# Patient Record
Sex: FEMALE | Race: BLACK OR AFRICAN AMERICAN | Employment: FULL TIME | URBAN - METROPOLITAN AREA
[De-identification: names, ages, dates, MRNs, and addresses within clinical notes are randomized per-mention and may not be internally consistent; named-entity substitution may affect disease eponyms.]

---

## 2017-01-09 DIAGNOSIS — I10 ESSENTIAL HYPERTENSION: ICD-10-CM

## 2017-01-09 DIAGNOSIS — E78.2 MIXED HYPERLIPIDEMIA: ICD-10-CM

## 2017-01-09 DIAGNOSIS — E11.9 TYPE 2 DIABETES MELLITUS WITHOUT COMPLICATION, WITHOUT LONG-TERM CURRENT USE OF INSULIN (HCC): ICD-10-CM

## 2017-01-09 DIAGNOSIS — E66.01 MORBID OBESITY DUE TO EXCESS CALORIES (HCC): Primary | ICD-10-CM

## 2017-01-09 PROCEDURE — 99214 OFFICE O/P EST MOD 30 MIN: CPT | Performed by: INTERNAL MEDICINE

## 2017-01-09 RX ORDER — OMEPRAZOLE 40 MG/1
40 CAPSULE, DELAYED RELEASE ORAL 2 TIMES DAILY
Qty: 60 CAPSULE | Refills: 0 | Status: SHIPPED | OUTPATIENT
Start: 2017-01-09 | End: 2017-03-07

## 2017-01-09 NOTE — TELEPHONE ENCOUNTER
Last ov-07/19/16, Due for ov for med f/u, lmtcb, pt transferred to  to schedule ov, did not request this medication

## 2017-01-23 ENCOUNTER — APPOINTMENT (OUTPATIENT)
Dept: LAB | Age: 32
End: 2017-01-23
Attending: INTERNAL MEDICINE
Payer: COMMERCIAL

## 2017-01-23 ENCOUNTER — OFFICE VISIT (OUTPATIENT)
Dept: INTERNAL MEDICINE CLINIC | Facility: CLINIC | Age: 32
End: 2017-01-23

## 2017-01-23 VITALS
WEIGHT: 284.5 LBS | SYSTOLIC BLOOD PRESSURE: 132 MMHG | RESPIRATION RATE: 19 BRPM | BODY MASS INDEX: 42.14 KG/M2 | TEMPERATURE: 98 F | HEIGHT: 69 IN | HEART RATE: 88 BPM | DIASTOLIC BLOOD PRESSURE: 84 MMHG

## 2017-01-23 DIAGNOSIS — I10 ESSENTIAL HYPERTENSION: ICD-10-CM

## 2017-01-23 DIAGNOSIS — E11.9 TYPE 2 DIABETES MELLITUS WITHOUT COMPLICATION, WITHOUT LONG-TERM CURRENT USE OF INSULIN (HCC): ICD-10-CM

## 2017-01-23 DIAGNOSIS — E78.2 MIXED HYPERLIPIDEMIA: ICD-10-CM

## 2017-01-23 DIAGNOSIS — E66.01 MORBID OBESITY DUE TO EXCESS CALORIES (HCC): ICD-10-CM

## 2017-01-23 DIAGNOSIS — E66.01 MORBID OBESITY DUE TO EXCESS CALORIES (HCC): Primary | ICD-10-CM

## 2017-01-23 DIAGNOSIS — K21.9 GASTROESOPHAGEAL REFLUX DISEASE, ESOPHAGITIS PRESENCE NOT SPECIFIED: ICD-10-CM

## 2017-01-23 LAB
ALT SERPL-CCNC: 15 U/L (ref 14–54)
AST SERPL-CCNC: 11 U/L (ref 15–41)
BUN BLD-MCNC: 12 MG/DL (ref 8–20)
CALCIUM BLD-MCNC: 9 MG/DL (ref 8.3–10.3)
CHLORIDE: 102 MMOL/L (ref 101–111)
CO2: 24 MMOL/L (ref 22–32)
CREAT BLD-MCNC: 0.93 MG/DL (ref 0.55–1.02)
CREAT UR-SCNC: 128 MG/DL
EST. AVERAGE GLUCOSE BLD GHB EST-MCNC: 154 MG/DL (ref 68–126)
GLUCOSE BLD-MCNC: 93 MG/DL (ref 70–99)
HBA1C MFR BLD HPLC: 7 % (ref ?–5.7)
MICROALBUMIN UR-MCNC: 1.45 MG/DL
MICROALBUMIN/CREAT 24H UR-RTO: 11.3 UG/MG (ref ?–30)
POTASSIUM SERPL-SCNC: 3.7 MMOL/L (ref 3.6–5.1)
SODIUM SERPL-SCNC: 137 MMOL/L (ref 136–144)

## 2017-01-23 PROCEDURE — 82043 UR ALBUMIN QUANTITATIVE: CPT | Performed by: INTERNAL MEDICINE

## 2017-01-23 PROCEDURE — 82570 ASSAY OF URINE CREATININE: CPT | Performed by: INTERNAL MEDICINE

## 2017-01-23 PROCEDURE — 36415 COLL VENOUS BLD VENIPUNCTURE: CPT | Performed by: INTERNAL MEDICINE

## 2017-01-23 PROCEDURE — 80048 BASIC METABOLIC PNL TOTAL CA: CPT | Performed by: INTERNAL MEDICINE

## 2017-01-23 PROCEDURE — 99214 OFFICE O/P EST MOD 30 MIN: CPT | Performed by: INTERNAL MEDICINE

## 2017-01-23 PROCEDURE — 83036 HEMOGLOBIN GLYCOSYLATED A1C: CPT | Performed by: INTERNAL MEDICINE

## 2017-01-23 PROCEDURE — 84450 TRANSFERASE (AST) (SGOT): CPT | Performed by: INTERNAL MEDICINE

## 2017-01-23 PROCEDURE — 84460 ALANINE AMINO (ALT) (SGPT): CPT | Performed by: INTERNAL MEDICINE

## 2017-01-23 RX ORDER — GARLIC EXTRACT 500 MG
1 CAPSULE ORAL DAILY
COMMUNITY
End: 2017-06-07

## 2017-01-23 RX ORDER — CETIRIZINE HYDROCHLORIDE 10 MG/1
10 TABLET ORAL DAILY
COMMUNITY

## 2017-01-23 NOTE — PROGRESS NOTES
Holger Early is a 32year old female. HPI:   Patient presents for diabetes and morbid obesity. 1. GERD: has been following strict diet but symptoms returned when she decreased omeprazole to once daily.  Now she is on it BID and symptoms are well contro 1/2016    Comment gastritis, duodenal ulcer, HP negative      Social History:      Smoking Status: Never Smoker                      Smokeless Status: Never Used                        Alcohol Use: Yes           0.0 oz/week       0 Standard drinks or equiv Screen: score of 0 on 1/23/2017  - not on ASA  # HLP: not at goal for a diabetic but we are hopeful that weight loss will improve this.    # Migraines: cont abortive tx with nsaids  # Allergic rhinitis: well controlled with daily zyrtec, prn flonase  # Benson

## 2017-02-03 ENCOUNTER — TELEPHONE (OUTPATIENT)
Dept: INTERNAL MEDICINE CLINIC | Facility: CLINIC | Age: 32
End: 2017-02-03

## 2017-02-07 RX ORDER — LISINOPRIL 10 MG/1
10 TABLET ORAL DAILY
Qty: 90 TABLET | Refills: 1 | Status: SHIPPED | OUTPATIENT
Start: 2017-02-07 | End: 2017-12-26

## 2017-03-07 DIAGNOSIS — E66.01 MORBID OBESITY DUE TO EXCESS CALORIES (HCC): Primary | ICD-10-CM

## 2017-03-07 DIAGNOSIS — I10 ESSENTIAL HYPERTENSION: ICD-10-CM

## 2017-03-07 DIAGNOSIS — E78.2 MIXED HYPERLIPIDEMIA: ICD-10-CM

## 2017-03-07 DIAGNOSIS — E11.9 TYPE 2 DIABETES MELLITUS WITHOUT COMPLICATION, WITHOUT LONG-TERM CURRENT USE OF INSULIN (HCC): ICD-10-CM

## 2017-03-08 RX ORDER — OMEPRAZOLE 40 MG/1
40 CAPSULE, DELAYED RELEASE ORAL 2 TIMES DAILY
Qty: 60 CAPSULE | Refills: 0 | Status: SHIPPED | OUTPATIENT
Start: 2017-03-08 | End: 2017-05-15

## 2017-03-08 NOTE — TELEPHONE ENCOUNTER
Last ov-01/23/17, pt was to return in 4 weeks for weight check, contrave approved see telephone 02/03/17

## 2017-04-26 RX ORDER — HYDROCHLOROTHIAZIDE 25 MG/1
25 TABLET ORAL DAILY
Qty: 90 TABLET | Refills: 0 | Status: SHIPPED | OUTPATIENT
Start: 2017-04-26 | End: 2018-03-07

## 2017-05-05 RX ORDER — DESOGESTREL AND ETHINYL ESTRADIOL 0.15-0.03
1 KIT ORAL DAILY
Qty: 84 TABLET | Refills: 1 | Status: SHIPPED | OUTPATIENT
Start: 2017-05-05 | End: 2017-06-07 | Stop reason: ALTCHOICE

## 2017-05-15 DIAGNOSIS — E66.01 MORBID OBESITY DUE TO EXCESS CALORIES (HCC): Primary | ICD-10-CM

## 2017-05-15 DIAGNOSIS — E78.2 MIXED HYPERLIPIDEMIA: ICD-10-CM

## 2017-05-15 DIAGNOSIS — E11.9 TYPE 2 DIABETES MELLITUS WITHOUT COMPLICATION, WITHOUT LONG-TERM CURRENT USE OF INSULIN (HCC): ICD-10-CM

## 2017-05-15 DIAGNOSIS — I10 ESSENTIAL HYPERTENSION: ICD-10-CM

## 2017-05-15 DIAGNOSIS — K21.9 GASTROESOPHAGEAL REFLUX DISEASE, ESOPHAGITIS PRESENCE NOT SPECIFIED: ICD-10-CM

## 2017-05-15 RX ORDER — OMEPRAZOLE 40 MG/1
40 CAPSULE, DELAYED RELEASE ORAL 2 TIMES DAILY
Qty: 60 CAPSULE | Refills: 0 | Status: SHIPPED | OUTPATIENT
Start: 2017-05-15 | End: 2017-08-14

## 2017-06-07 ENCOUNTER — OFFICE VISIT (OUTPATIENT)
Dept: INTERNAL MEDICINE CLINIC | Facility: CLINIC | Age: 32
End: 2017-06-07

## 2017-06-07 VITALS
WEIGHT: 281 LBS | DIASTOLIC BLOOD PRESSURE: 76 MMHG | HEART RATE: 103 BPM | SYSTOLIC BLOOD PRESSURE: 138 MMHG | HEIGHT: 69 IN | RESPIRATION RATE: 20 BRPM | BODY MASS INDEX: 41.62 KG/M2 | TEMPERATURE: 98 F | OXYGEN SATURATION: 98 %

## 2017-06-07 DIAGNOSIS — E11.9 TYPE 2 DIABETES MELLITUS WITHOUT COMPLICATION, WITHOUT LONG-TERM CURRENT USE OF INSULIN (HCC): ICD-10-CM

## 2017-06-07 DIAGNOSIS — E78.2 MIXED HYPERLIPIDEMIA: ICD-10-CM

## 2017-06-07 DIAGNOSIS — I10 ESSENTIAL HYPERTENSION: ICD-10-CM

## 2017-06-07 DIAGNOSIS — E66.01 MORBID OBESITY DUE TO EXCESS CALORIES (HCC): Primary | ICD-10-CM

## 2017-06-07 PROCEDURE — 99214 OFFICE O/P EST MOD 30 MIN: CPT | Performed by: INTERNAL MEDICINE

## 2017-06-07 RX ORDER — LEVONORGESTREL AND ETHINYL ESTRADIOL 0.15-0.03
1 KIT ORAL DAILY
Qty: 91 TABLET | Refills: 1 | Status: SHIPPED | OUTPATIENT
Start: 2017-06-07 | End: 2018-05-02

## 2017-06-07 NOTE — PATIENT INSTRUCTIONS
For your diabetes: Please stop metformin and change to farxiga 5 mg once daily NOW. Please at least try to measure your fasting blood sugar in the morning 3-4 times/week and send me those numbers every 2 weeks.  This will help me figure out if we need to in

## 2017-06-07 NOTE — PROGRESS NOTES
Mp Lee is a 28year old female. HPI:   Patient presents for diabetes and morbid obesity. 1. Morbid Obesity: tried contrave and when she got to 2 pills/day she developed significant GI distress and stomach upset.  Tried it for 2.5 weeks and then h Date   • Type II or unspecified type diabetes mellitus without mention of complication, not stated as uncontrolled           Past Surgical History    UPPER GI ENDOSCOPY PERFORMED  1/2016    Comment gastritis, duodenal ulcer, HP negative      Social History retinopathy.   - Foot Exam: 1/23/2017  - LDL: no indication for statin now, cont weight loss  - BP: see above  - micro alb:creat <30 in 1/2017  - Depression Screen: score of 0 on 1/23/2017  - not on ASA  # Migraines: cont abortive tx with nsaids  # Jesica Bautista

## 2017-08-14 DIAGNOSIS — K21.9 GASTROESOPHAGEAL REFLUX DISEASE, ESOPHAGITIS PRESENCE NOT SPECIFIED: ICD-10-CM

## 2017-08-15 RX ORDER — OMEPRAZOLE 40 MG/1
40 CAPSULE, DELAYED RELEASE ORAL 2 TIMES DAILY
Qty: 60 CAPSULE | Refills: 0 | Status: SHIPPED | OUTPATIENT
Start: 2017-08-15 | End: 2017-09-21

## 2017-09-12 RX ORDER — TOPIRAMATE 25 MG/1
25 TABLET ORAL DAILY
Qty: 30 TABLET | Refills: 1 | OUTPATIENT
Start: 2017-09-12 | End: 2018-09-07

## 2017-09-20 ENCOUNTER — TELEPHONE (OUTPATIENT)
Dept: INTERNAL MEDICINE CLINIC | Facility: CLINIC | Age: 32
End: 2017-09-20

## 2017-09-20 DIAGNOSIS — K21.9 GASTROESOPHAGEAL REFLUX DISEASE, ESOPHAGITIS PRESENCE NOT SPECIFIED: ICD-10-CM

## 2017-09-20 RX ORDER — TOPIRAMATE 25 MG/1
25 TABLET ORAL DAILY
Qty: 30 TABLET | Refills: 1 | Status: CANCELLED | OUTPATIENT
Start: 2017-09-20 | End: 2018-09-15

## 2017-09-20 RX ORDER — OMEPRAZOLE 40 MG/1
40 CAPSULE, DELAYED RELEASE ORAL 2 TIMES DAILY
Qty: 60 CAPSULE | Refills: 0 | Status: CANCELLED | OUTPATIENT
Start: 2017-09-20 | End: 2018-09-15

## 2017-09-20 NOTE — TELEPHONE ENCOUNTER
Pharmacy calling on behalf of pt following up on refill request for Omeprazole 40 MG  And topiramate 25 MG. Patient does have an appointment scheduled with Dr. Lg Underwood on 09/29/17.

## 2017-09-21 RX ORDER — TOPIRAMATE 25 MG/1
25 TABLET ORAL DAILY
Qty: 30 TABLET | Refills: 0 | Status: SHIPPED | OUTPATIENT
Start: 2017-09-21 | End: 2017-11-14

## 2017-09-21 RX ORDER — OMEPRAZOLE 40 MG/1
40 CAPSULE, DELAYED RELEASE ORAL 2 TIMES DAILY
Qty: 60 CAPSULE | Refills: 0 | Status: SHIPPED | OUTPATIENT
Start: 2017-09-21 | End: 2017-09-29 | Stop reason: DRUGHIGH

## 2017-09-27 ENCOUNTER — APPOINTMENT (OUTPATIENT)
Dept: LAB | Age: 32
End: 2017-09-27
Attending: INTERNAL MEDICINE
Payer: COMMERCIAL

## 2017-09-27 DIAGNOSIS — E66.01 MORBID OBESITY DUE TO EXCESS CALORIES (HCC): ICD-10-CM

## 2017-09-27 DIAGNOSIS — I10 ESSENTIAL HYPERTENSION: ICD-10-CM

## 2017-09-27 DIAGNOSIS — E11.9 TYPE 2 DIABETES MELLITUS WITHOUT COMPLICATION, WITHOUT LONG-TERM CURRENT USE OF INSULIN (HCC): ICD-10-CM

## 2017-09-27 DIAGNOSIS — E78.2 MIXED HYPERLIPIDEMIA: ICD-10-CM

## 2017-09-27 PROCEDURE — 84450 TRANSFERASE (AST) (SGOT): CPT

## 2017-09-27 PROCEDURE — 84460 ALANINE AMINO (ALT) (SGPT): CPT

## 2017-09-27 PROCEDURE — 80061 LIPID PANEL: CPT

## 2017-09-27 PROCEDURE — 83036 HEMOGLOBIN GLYCOSYLATED A1C: CPT

## 2017-09-27 PROCEDURE — 36415 COLL VENOUS BLD VENIPUNCTURE: CPT

## 2017-09-27 PROCEDURE — 80048 BASIC METABOLIC PNL TOTAL CA: CPT

## 2017-09-29 ENCOUNTER — OFFICE VISIT (OUTPATIENT)
Dept: INTERNAL MEDICINE CLINIC | Facility: CLINIC | Age: 32
End: 2017-09-29

## 2017-09-29 VITALS
WEIGHT: 275.75 LBS | RESPIRATION RATE: 18 BRPM | BODY MASS INDEX: 39.92 KG/M2 | SYSTOLIC BLOOD PRESSURE: 124 MMHG | HEIGHT: 69.5 IN | HEART RATE: 96 BPM | TEMPERATURE: 99 F | OXYGEN SATURATION: 98 % | DIASTOLIC BLOOD PRESSURE: 84 MMHG

## 2017-09-29 DIAGNOSIS — Z30.09 ENCOUNTER FOR COUNSELING REGARDING CONTRACEPTION: ICD-10-CM

## 2017-09-29 DIAGNOSIS — Z00.00 ROUTINE GENERAL MEDICAL EXAMINATION AT A HEALTH CARE FACILITY: Primary | ICD-10-CM

## 2017-09-29 PROCEDURE — 90471 IMMUNIZATION ADMIN: CPT | Performed by: INTERNAL MEDICINE

## 2017-09-29 PROCEDURE — 99395 PREV VISIT EST AGE 18-39: CPT | Performed by: INTERNAL MEDICINE

## 2017-09-29 PROCEDURE — 90686 IIV4 VACC NO PRSV 0.5 ML IM: CPT | Performed by: INTERNAL MEDICINE

## 2017-09-29 RX ORDER — OMEPRAZOLE 40 MG/1
40 CAPSULE, DELAYED RELEASE ORAL DAILY
COMMUNITY
End: 2017-11-14

## 2017-09-29 NOTE — PROGRESS NOTES
Jed Limon is a 28year old female. HPI:   Patient presents for physical exam.  1. Obese: started topamax on 7/26/17 with starting weight of 281. She has lost 6 lbs on therapy. She never tried phentermine because she did not complete TTE.  She feels fo uncontrolled       Past Surgical History:  1/2016: UPPER GI ENDOSCOPY PERFORMED      Comment: gastritis, duodenal ulcer, HP negative   Social History:    Smoking status: Never Smoker                                                              Smokeless to diet. Increase farxiga to 10 mg and hopeful to become more active.    - had diarrhea with metformin   - Eye Exam on 11/17/16 by Rizwana Garzon w/o diabetic retinopathy.   - Foot Exam: 1/23/2017  - LDL: no indication for statin now, cont weight loss  - BP: see

## 2017-09-29 NOTE — PATIENT INSTRUCTIONS
For your heartburn, please decrease your omeprazole to 40 mg 30 minutes prior to breakfast only. If you start to have evening symptoms, you can take ranitidine (zantac) 300 mg 1 hour prior to dinner or prior to bedtime.  Once you have completed the supply o

## 2017-11-13 ENCOUNTER — TELEPHONE (OUTPATIENT)
Dept: INTERNAL MEDICINE CLINIC | Facility: CLINIC | Age: 32
End: 2017-11-13

## 2017-11-13 DIAGNOSIS — Z00.00 ROUTINE GENERAL MEDICAL EXAMINATION AT A HEALTH CARE FACILITY: ICD-10-CM

## 2017-11-13 NOTE — TELEPHONE ENCOUNTER
Requesting Omeprazole  LOV: 9/29/17  RTC: 3-6 mos  Last Relevant Labs: 9/27/17  Filled: 0 # with  refills    Future Appointments  Date Time Provider Sonia Edwards   11/14/2017 1:30 PM Twan Lambert MD [facility]

## 2017-11-14 ENCOUNTER — OFFICE VISIT (OUTPATIENT)
Dept: OBGYN CLINIC | Facility: CLINIC | Age: 32
End: 2017-11-14

## 2017-11-14 VITALS
DIASTOLIC BLOOD PRESSURE: 76 MMHG | WEIGHT: 273 LBS | BODY MASS INDEX: 40.43 KG/M2 | SYSTOLIC BLOOD PRESSURE: 112 MMHG | HEIGHT: 68.75 IN

## 2017-11-14 DIAGNOSIS — I10 HYPERTENSION, UNSPECIFIED TYPE: ICD-10-CM

## 2017-11-14 DIAGNOSIS — N92.0 MENORRHAGIA WITH REGULAR CYCLE: Primary | ICD-10-CM

## 2017-11-14 PROCEDURE — 99202 OFFICE O/P NEW SF 15 MIN: CPT | Performed by: OBSTETRICS & GYNECOLOGY

## 2017-11-14 RX ORDER — OMEPRAZOLE 40 MG/1
40 CAPSULE, DELAYED RELEASE ORAL DAILY
Qty: 90 CAPSULE | Refills: 0 | Status: SHIPPED | OUTPATIENT
Start: 2017-11-14 | End: 2018-06-29

## 2017-11-14 RX ORDER — ACETAMINOPHEN AND CODEINE PHOSPHATE 120; 12 MG/5ML; MG/5ML
0.35 SOLUTION ORAL DAILY
Qty: 3 PACKAGE | Refills: 3 | Status: SHIPPED | OUTPATIENT
Start: 2017-11-14 | End: 2017-12-12

## 2017-11-14 NOTE — PROGRESS NOTES
New gynecology visit. 28year old G 1 P 65 black female. Patient's last menstrual period was 09/20/2017 (within days). Jessenia [place] Here for consultation concerning treatment of menorrhagia.  Long standing hx of heavy menses for which she has been on many forms Omeprazole 40 MG Oral Capsule Delayed Release Take 1 capsule (40 mg total) by mouth daily. Disp: 90 capsule Rfl: 0     No current facility-administered medications on file prior to visit. ROS:    General:  No wt los, wt gain, appetite changes.   Eyes:

## 2017-12-27 NOTE — TELEPHONE ENCOUNTER
Requesting Lisinopril  LOV: 9/29/17  RTC: 12/17-3/24/18  Last Relevant Labs: 9/29/17  Filled: 2/7/17 #90 with 1refills    No future appointments.

## 2017-12-28 RX ORDER — LISINOPRIL 10 MG/1
10 TABLET ORAL DAILY
Qty: 90 TABLET | Refills: 0 | Status: SHIPPED
Start: 2017-12-28 | End: 2018-04-02

## 2017-12-28 RX ORDER — LISINOPRIL 10 MG/1
10 TABLET ORAL DAILY
Qty: 90 TABLET | Refills: 1 | OUTPATIENT
Start: 2017-12-28

## 2018-03-07 DIAGNOSIS — K21.9 GASTROESOPHAGEAL REFLUX DISEASE, ESOPHAGITIS PRESENCE NOT SPECIFIED: ICD-10-CM

## 2018-03-09 RX ORDER — HYDROCHLOROTHIAZIDE 25 MG/1
25 TABLET ORAL DAILY
Qty: 90 TABLET | Refills: 1 | Status: SHIPPED | OUTPATIENT
Start: 2018-03-09 | End: 2018-08-15

## 2018-03-09 RX ORDER — OMEPRAZOLE 40 MG/1
40 CAPSULE, DELAYED RELEASE ORAL 2 TIMES DAILY
Qty: 60 CAPSULE | Refills: 1 | Status: SHIPPED | OUTPATIENT
Start: 2018-03-09 | End: 2018-05-02

## 2018-04-02 DIAGNOSIS — I10 ESSENTIAL HYPERTENSION: ICD-10-CM

## 2018-04-02 DIAGNOSIS — E11.9 TYPE 2 DIABETES MELLITUS WITHOUT COMPLICATION, WITHOUT LONG-TERM CURRENT USE OF INSULIN (HCC): Primary | ICD-10-CM

## 2018-04-02 DIAGNOSIS — E78.2 MIXED HYPERLIPIDEMIA: ICD-10-CM

## 2018-04-02 NOTE — TELEPHONE ENCOUNTER
Lisinopril 10 mg 1 tab daily filled 12-28-17 90 with 0 refill     LOV 9-29-17     HTN: tolerating lisinopril and hctz well.     HTN: well controlled on hctz, lisinopril    return in 3-6 months     Labs 9-27-17

## 2018-04-03 RX ORDER — LISINOPRIL 10 MG/1
10 TABLET ORAL DAILY
Qty: 30 TABLET | Refills: 0 | Status: SHIPPED | OUTPATIENT
Start: 2018-04-03 | End: 2018-05-03

## 2018-04-10 DIAGNOSIS — Z00.00 ROUTINE GENERAL MEDICAL EXAMINATION AT A HEALTH CARE FACILITY: ICD-10-CM

## 2018-04-11 NOTE — TELEPHONE ENCOUNTER
Farxiga 10 mg 1 tab daily filled 9-29-17 90 with 1 refill     LOV 9-29-17     DM: tolerating farxiga well    Trying to follow ADA diet.  Increase farxiga to 10 mg and hopeful to become more active     return in 3-6 months     Labs 9-27-17     HgbA1C <5.7 % 7.7

## 2018-04-12 RX ORDER — DAPAGLIFLOZIN 10 MG/1
10 TABLET, FILM COATED ORAL DAILY
Qty: 90 TABLET | Refills: 1 | OUTPATIENT
Start: 2018-04-12 | End: 2018-05-12

## 2018-04-14 ENCOUNTER — APPOINTMENT (OUTPATIENT)
Dept: LAB | Facility: HOSPITAL | Age: 33
End: 2018-04-14
Attending: INTERNAL MEDICINE
Payer: COMMERCIAL

## 2018-04-14 DIAGNOSIS — I10 ESSENTIAL HYPERTENSION: ICD-10-CM

## 2018-04-14 DIAGNOSIS — E11.9 TYPE 2 DIABETES MELLITUS WITHOUT COMPLICATION, WITHOUT LONG-TERM CURRENT USE OF INSULIN (HCC): ICD-10-CM

## 2018-04-14 DIAGNOSIS — E78.2 MIXED HYPERLIPIDEMIA: ICD-10-CM

## 2018-04-14 PROCEDURE — 84450 TRANSFERASE (AST) (SGOT): CPT

## 2018-04-14 PROCEDURE — 84460 ALANINE AMINO (ALT) (SGPT): CPT

## 2018-04-14 PROCEDURE — 82570 ASSAY OF URINE CREATININE: CPT

## 2018-04-14 PROCEDURE — 82043 UR ALBUMIN QUANTITATIVE: CPT

## 2018-04-14 PROCEDURE — 36415 COLL VENOUS BLD VENIPUNCTURE: CPT

## 2018-04-14 PROCEDURE — 80048 BASIC METABOLIC PNL TOTAL CA: CPT

## 2018-04-14 PROCEDURE — 83036 HEMOGLOBIN GLYCOSYLATED A1C: CPT

## 2018-05-02 ENCOUNTER — OFFICE VISIT (OUTPATIENT)
Dept: INTERNAL MEDICINE CLINIC | Facility: CLINIC | Age: 33
End: 2018-05-02

## 2018-05-02 VITALS
RESPIRATION RATE: 16 BRPM | TEMPERATURE: 99 F | WEIGHT: 279.5 LBS | HEART RATE: 78 BPM | DIASTOLIC BLOOD PRESSURE: 80 MMHG | SYSTOLIC BLOOD PRESSURE: 120 MMHG | HEIGHT: 69 IN | BODY MASS INDEX: 41.4 KG/M2

## 2018-05-02 DIAGNOSIS — I10 ESSENTIAL HYPERTENSION: ICD-10-CM

## 2018-05-02 DIAGNOSIS — E11.65 UNCONTROLLED TYPE 2 DIABETES MELLITUS WITH HYPERGLYCEMIA, WITHOUT LONG-TERM CURRENT USE OF INSULIN (HCC): Primary | ICD-10-CM

## 2018-05-02 DIAGNOSIS — E66.01 MORBID OBESITY (HCC): ICD-10-CM

## 2018-05-02 DIAGNOSIS — K21.9 GASTROESOPHAGEAL REFLUX DISEASE, ESOPHAGITIS PRESENCE NOT SPECIFIED: ICD-10-CM

## 2018-05-02 PROCEDURE — 99214 OFFICE O/P EST MOD 30 MIN: CPT | Performed by: INTERNAL MEDICINE

## 2018-05-02 RX ORDER — RANITIDINE 150 MG/1
150 CAPSULE ORAL 2 TIMES DAILY
Qty: 180 CAPSULE | Refills: 1 | Status: SHIPPED | OUTPATIENT
Start: 2018-05-02 | End: 2019-03-15

## 2018-05-02 RX ORDER — ACETAMINOPHEN AND CODEINE PHOSPHATE 120; 12 MG/5ML; MG/5ML
0.35 SOLUTION ORAL DAILY
COMMUNITY
Start: 2018-04-27 | End: 2019-10-23

## 2018-05-02 RX ORDER — METFORMIN HYDROCHLORIDE EXTENDED-RELEASE TABLETS 500 MG/1
500 TABLET, FILM COATED, EXTENDED RELEASE ORAL
Qty: 30 TABLET | Refills: 1 | Status: SHIPPED | OUTPATIENT
Start: 2018-05-02 | End: 2018-06-29

## 2018-05-02 NOTE — PROGRESS NOTES
Jed Limon is a 28year old female. HPI:   Patient presents for the following issues. 1. Worsening DM: she is taking her farxiga daily. She eats healthy Monday through Friday. But the weekends she feels she is overindulging.  She is not walkign as muc Dysmenorrhea    • Hypertension    • Type II or unspecified type diabetes mellitus without mention of complication, not stated as uncontrolled       Past Surgical History:  1/2016: UPPER GI ENDOSCOPY PERFORMED      Comment: gastritis, duodenal ulcer, HP neg omeprazole daily. Will try zantac 30 minutes prior to a heavy meal and only use omeprazole for breakthrough symptoms. # HTN: well controlled on hctz, lisinopril  # Type 2 Diabetes: worsening. A1C 8.4 in 4/2018. Again discussed measures for weight loss.  Fern Osborn

## 2018-05-02 NOTE — PATIENT INSTRUCTIONS
Resources for exercise: www. [email or web address]/blog/getting-started-with-challenge/    For your diabetes please continue the farxiga 10 mg daily but also add fortamet (metformin) 500 mg.   Week One: 1 tablet with breafkast daily  Week Two: 2 tablets with breakf

## 2018-05-03 DIAGNOSIS — I10 ESSENTIAL HYPERTENSION: ICD-10-CM

## 2018-05-03 DIAGNOSIS — E11.9 TYPE 2 DIABETES MELLITUS WITHOUT COMPLICATION, WITHOUT LONG-TERM CURRENT USE OF INSULIN (HCC): ICD-10-CM

## 2018-05-03 DIAGNOSIS — E78.2 MIXED HYPERLIPIDEMIA: ICD-10-CM

## 2018-05-03 RX ORDER — LISINOPRIL 10 MG/1
10 TABLET ORAL DAILY
Qty: 30 TABLET | Refills: 5 | Status: SHIPPED | OUTPATIENT
Start: 2018-05-03 | End: 2018-10-05

## 2018-05-08 ENCOUNTER — PATIENT MESSAGE (OUTPATIENT)
Dept: INTERNAL MEDICINE CLINIC | Facility: CLINIC | Age: 33
End: 2018-05-08

## 2018-05-08 ENCOUNTER — TELEPHONE (OUTPATIENT)
Dept: INTERNAL MEDICINE CLINIC | Facility: CLINIC | Age: 33
End: 2018-05-08

## 2018-05-08 DIAGNOSIS — E11.9 TYPE 2 DIABETES MELLITUS WITHOUT COMPLICATION, WITHOUT LONG-TERM CURRENT USE OF INSULIN (HCC): Primary | ICD-10-CM

## 2018-05-08 NOTE — TELEPHONE ENCOUNTER
Spoke to patient, Coletta Hemp is a covered benefit for Metformin and patient states prefers oral than injectable, please advise

## 2018-05-08 NOTE — TELEPHONE ENCOUNTER
[facility] calling in stated the med MetFORMIN HCl ER, OSM, 500 MG (OSM) Oral Tablet 24 Hr    Is not covered under patients insurance.

## 2018-05-09 NOTE — TELEPHONE ENCOUNTER
I spoke with pt and confirmed she reviewed Dr Brendan Ortiz message. Requesting needles for pen. Please approve pended orders.

## 2018-05-10 ENCOUNTER — TELEPHONE (OUTPATIENT)
Dept: ENDOCRINOLOGY CLINIC | Facility: CLINIC | Age: 33
End: 2018-05-10

## 2018-05-10 DIAGNOSIS — E11.9 TYPE 2 DIABETES MELLITUS WITHOUT COMPLICATION, WITHOUT LONG-TERM CURRENT USE OF INSULIN (HCC): Primary | ICD-10-CM

## 2018-05-14 ENCOUNTER — NURSE ONLY (OUTPATIENT)
Dept: ENDOCRINOLOGY CLINIC | Facility: CLINIC | Age: 33
End: 2018-05-14

## 2018-05-14 VITALS — HEIGHT: 69 IN | BODY MASS INDEX: 41.03 KG/M2 | WEIGHT: 277 LBS

## 2018-05-14 DIAGNOSIS — E11.9 TYPE 2 DIABETES MELLITUS WITHOUT COMPLICATION, WITHOUT LONG-TERM CURRENT USE OF INSULIN (HCC): Primary | ICD-10-CM

## 2018-05-14 PROCEDURE — G0108 DIAB MANAGE TRN  PER INDIV: HCPCS

## 2018-05-14 NOTE — PROGRESS NOTES
Rosalee Bucio  : 1985 was seen for Injection Instruction:    Date: 2018  Start time: 3:30pm End time: 4:00    Ht 69\"   Wt 277 lb   BMI 40.91 kg/m²     Glucose today: 159      Medication type, dose and frequency: Victoza 0.6 mg for 1-2 weeks and

## 2018-05-29 DIAGNOSIS — E11.9 TYPE 2 DIABETES MELLITUS WITHOUT COMPLICATION, WITHOUT LONG-TERM CURRENT USE OF INSULIN (HCC): ICD-10-CM

## 2018-05-29 RX ORDER — LIRAGLUTIDE 6 MG/ML
INJECTION SUBCUTANEOUS
Refills: 0 | OUTPATIENT
Start: 2018-05-29

## 2018-06-04 ENCOUNTER — NURSE ONLY (OUTPATIENT)
Dept: ENDOCRINOLOGY CLINIC | Facility: CLINIC | Age: 33
End: 2018-06-04

## 2018-06-04 VITALS
HEART RATE: 100 BPM | SYSTOLIC BLOOD PRESSURE: 120 MMHG | WEIGHT: 270 LBS | DIASTOLIC BLOOD PRESSURE: 80 MMHG | BODY MASS INDEX: 40 KG/M2

## 2018-06-04 DIAGNOSIS — E11.9 TYPE 2 DIABETES MELLITUS WITHOUT COMPLICATION, WITHOUT LONG-TERM CURRENT USE OF INSULIN (HCC): Primary | ICD-10-CM

## 2018-06-04 PROCEDURE — G0108 DIAB MANAGE TRN  PER INDIV: HCPCS

## 2018-06-04 NOTE — PROGRESS NOTES
Richar MUIR: 1985 was seen for Diabetic Education Follow up:    Date: 2018   Start time:1:30 End time: 2:30    Assessment:     Assessment: /80   Pulse 100   Wt 270 lb   BMI 39.87 kg/m²        HEMOGLOBIN A1c (% of total Hgb)   Date Value Ref Range   Alt 19 14 - 54 U/L   -AST (SGOT)   Result Value Ref Range   AST 11 (L) 15 - 41 U/L   -BASIC METABOLIC PANEL (8)   Result Value Ref Range   Glucose 145 (H) 70 - 99 mg/dL   BUN 19 8 - 20 mg/dL   Creatinine 1.11 (H) 0.55 - 1.02 mg/dL   GFR, Non-Af time.    Sarah Couch RN, CDE

## 2018-06-27 ENCOUNTER — TELEPHONE (OUTPATIENT)
Dept: INTERNAL MEDICINE CLINIC | Facility: CLINIC | Age: 33
End: 2018-06-27

## 2018-06-27 NOTE — TELEPHONE ENCOUNTER
Patient has been messing Dr. Kandy Oneil in regards to setting up an appointment for [facility] . In the [facility] message Dr. Kandy Connelly said to pick a date and time to over book on her schedule.  Patient would like to come in on Friday 06/29/18 at 8:15am. P

## 2018-06-29 ENCOUNTER — OFFICE VISIT (OUTPATIENT)
Dept: INTERNAL MEDICINE CLINIC | Facility: CLINIC | Age: 33
End: 2018-06-29

## 2018-06-29 ENCOUNTER — TELEPHONE (OUTPATIENT)
Dept: INTERNAL MEDICINE CLINIC | Facility: CLINIC | Age: 33
End: 2018-06-29

## 2018-06-29 VITALS
SYSTOLIC BLOOD PRESSURE: 120 MMHG | BODY MASS INDEX: 39.58 KG/M2 | RESPIRATION RATE: 16 BRPM | HEART RATE: 100 BPM | TEMPERATURE: 99 F | DIASTOLIC BLOOD PRESSURE: 78 MMHG | WEIGHT: 267.25 LBS | HEIGHT: 69 IN

## 2018-06-29 DIAGNOSIS — E11.9 TYPE 2 DIABETES MELLITUS WITHOUT COMPLICATION, WITHOUT LONG-TERM CURRENT USE OF INSULIN (HCC): ICD-10-CM

## 2018-06-29 DIAGNOSIS — N89.8 VAGINAL DISCHARGE: Primary | ICD-10-CM

## 2018-06-29 DIAGNOSIS — E66.01 MORBID OBESITY (HCC): ICD-10-CM

## 2018-06-29 DIAGNOSIS — N89.8 VAGINAL ITCHING: ICD-10-CM

## 2018-06-29 DIAGNOSIS — I10 ESSENTIAL HYPERTENSION: ICD-10-CM

## 2018-06-29 PROCEDURE — 87660 TRICHOMONAS VAGIN DIR PROBE: CPT | Performed by: INTERNAL MEDICINE

## 2018-06-29 PROCEDURE — 87510 GARDNER VAG DNA DIR PROBE: CPT | Performed by: INTERNAL MEDICINE

## 2018-06-29 PROCEDURE — 99214 OFFICE O/P EST MOD 30 MIN: CPT | Performed by: INTERNAL MEDICINE

## 2018-06-29 PROCEDURE — 87491 CHLMYD TRACH DNA AMP PROBE: CPT | Performed by: INTERNAL MEDICINE

## 2018-06-29 PROCEDURE — 87480 CANDIDA DNA DIR PROBE: CPT | Performed by: INTERNAL MEDICINE

## 2018-06-29 PROCEDURE — 87591 N.GONORRHOEAE DNA AMP PROB: CPT | Performed by: INTERNAL MEDICINE

## 2018-06-29 NOTE — PROGRESS NOTES
Ezio Hanson is a 35year old female. HPI:   Patient presents for the following issues. 1. DM: has been taking farxiga and victoza both since May 2nd, 2018. Initially tolerated victoza very well for 1 month.  Over past 10 days her blood sugar started ri • Dysmenorrhea    • Hypertension    • Type II or unspecified type diabetes mellitus without mention of complication, not stated as uncontrolled       Past Surgical History:  1/2016: UPPER GI ENDOSCOPY PERFORMED      Comment: gastritis, duodenal ulcer, HP 4/2018.   - was doing really well on victoza but having injection site reactions so need to d/c.  I am hopeful she will do better on trulicity but if not, we will have to consider DPPV-4 inh.   - cont farxiga 10 mg daily   - had diarrhea with metformin but

## 2018-06-29 NOTE — PATIENT INSTRUCTIONS
Please stop the victoza and START once weekly trulicity injections. Please continue to keep me posted on your symptoms and blood sugars.

## 2018-07-02 ENCOUNTER — APPOINTMENT (OUTPATIENT)
Dept: LAB | Age: 33
End: 2018-07-02
Attending: INTERNAL MEDICINE
Payer: COMMERCIAL

## 2018-07-02 ENCOUNTER — TELEPHONE (OUTPATIENT)
Dept: INTERNAL MEDICINE CLINIC | Facility: CLINIC | Age: 33
End: 2018-07-02

## 2018-07-02 DIAGNOSIS — N89.8 VAGINAL DISCHARGE: ICD-10-CM

## 2018-07-02 PROCEDURE — 87491 CHLMYD TRACH DNA AMP PROBE: CPT

## 2018-07-02 PROCEDURE — 87591 N.GONORRHOEAE DNA AMP PROB: CPT

## 2018-07-02 NOTE — TELEPHONE ENCOUNTER
Louise from [facility] [employer] called and CHLAMYDIA/GONOCOCCUS, LALITA      sample was sent with swab inside with specimen so test cannot be done please contact patient

## 2018-07-02 NOTE — TELEPHONE ENCOUNTER
I called pateint to discussed her vaginal swab results. I explained trichomonas is a sexually transmitted disease so it is very important her partner is also treated. Metronidazole is treatment for both trich and BV.  She expresses understanding and agreeme

## 2018-07-02 NOTE — TELEPHONE ENCOUNTER
She does not need to see me. Please ask her to give urine sample (at lab) for chlamydia/gonorrhea testing. I have placed the order.  tY

## 2018-07-03 LAB
C TRACH DNA SPEC QL NAA+PROBE: NEGATIVE
N GONORRHOEA DNA SPEC QL NAA+PROBE: NEGATIVE

## 2018-07-25 DIAGNOSIS — N89.8 VAGINAL DISCHARGE: ICD-10-CM

## 2018-07-25 DIAGNOSIS — N89.8 VAGINAL ITCHING: ICD-10-CM

## 2018-07-25 RX ORDER — DULAGLUTIDE 0.75 MG/.5ML
0.75 INJECTION, SOLUTION SUBCUTANEOUS WEEKLY
Qty: 0.5 ML | Refills: 0 | Status: SHIPPED | OUTPATIENT
Start: 2018-07-25 | End: 2018-07-25

## 2018-07-25 NOTE — TELEPHONE ENCOUNTER
Protocol failed - Last HgBA1C < 7.5      Medication(s) to Refill:   Pending Prescriptions Disp Refills    TRULICITY 8.43 WS/5.5OB Subcutaneous Solution Pen-injector [Pharmacy Med Name: TRULICITY 3.51 XD/9.8 ML PEN]  0     Sig: INJECT 0.75 MG INTO THE SKIN

## 2018-08-15 RX ORDER — HYDROCHLOROTHIAZIDE 25 MG/1
25 TABLET ORAL DAILY
Qty: 90 TABLET | Refills: 1 | Status: SHIPPED | OUTPATIENT
Start: 2018-08-15 | End: 2019-03-15

## 2018-08-15 NOTE — TELEPHONE ENCOUNTER
Medication(s) to Refill:   Pending Prescriptions Disp Refills    HYDROCHLOROTHIAZIDE 25 MG Oral Tab [Pharmacy Med Name: HYDROCHLOROTHIAZIDE 25 MG TAB] 90 tablet 1     Sig: TAKE 1 TABLET (25 MG TOTAL) BY MOUTH DAILY.            Last Time Medication was [facility]

## 2018-09-04 RX ORDER — LIRAGLUTIDE 6 MG/ML
INJECTION SUBCUTANEOUS
Refills: 0 | OUTPATIENT
Start: 2018-09-04

## 2018-09-04 NOTE — TELEPHONE ENCOUNTER
We changed from [address] [place] [address] to [facility] on 6/79/1142.

## 2018-09-07 ENCOUNTER — APPOINTMENT (OUTPATIENT)
Dept: LAB | Age: 33
End: 2018-09-07
Attending: INTERNAL MEDICINE
Payer: COMMERCIAL

## 2018-09-07 DIAGNOSIS — E11.65 UNCONTROLLED TYPE 2 DIABETES MELLITUS WITH HYPERGLYCEMIA, WITHOUT LONG-TERM CURRENT USE OF INSULIN (HCC): ICD-10-CM

## 2018-09-07 LAB
EST. AVERAGE GLUCOSE BLD GHB EST-MCNC: 174 MG/DL (ref 68–126)
HBA1C MFR BLD HPLC: 7.7 % (ref ?–5.7)

## 2018-09-07 PROCEDURE — 36415 COLL VENOUS BLD VENIPUNCTURE: CPT

## 2018-09-07 PROCEDURE — 83036 HEMOGLOBIN GLYCOSYLATED A1C: CPT

## 2018-09-11 ENCOUNTER — NURSE ONLY (OUTPATIENT)
Dept: ENDOCRINOLOGY CLINIC | Facility: CLINIC | Age: 33
End: 2018-09-11
Payer: COMMERCIAL

## 2018-09-11 VITALS
DIASTOLIC BLOOD PRESSURE: 81 MMHG | HEART RATE: 103 BPM | HEIGHT: 69 IN | SYSTOLIC BLOOD PRESSURE: 123 MMHG | WEIGHT: 278 LBS | BODY MASS INDEX: 41.18 KG/M2

## 2018-09-11 DIAGNOSIS — E11.9 TYPE 2 DIABETES MELLITUS WITHOUT COMPLICATION, WITHOUT LONG-TERM CURRENT USE OF INSULIN (HCC): Primary | ICD-10-CM

## 2018-09-11 PROCEDURE — G0108 DIAB MANAGE TRN  PER INDIV: HCPCS

## 2018-09-11 NOTE — PROGRESS NOTES
Mp Lee  : 1985 was seen for Diabetic Education Follow up:    Date: 2018   Start time: 5:30 End time: 6:30p    Assessment:     Assessment: /81   Pulse 103   Ht 69\"   Wt 278 lb   BMI 41.05 kg/m²      HEMOGLOBIN A1c (% of total Hgb) food models. Reviewed principles for heart healthy diet (low fate, low saturated fat, high fiber, reduced sodium)    Being Active  Benefits and effect of activity on BG discussed. Reviewed when to call MD and benefits of goal setting.       Monitoring  Re

## 2018-10-05 DIAGNOSIS — E78.2 MIXED HYPERLIPIDEMIA: ICD-10-CM

## 2018-10-05 DIAGNOSIS — E11.9 TYPE 2 DIABETES MELLITUS WITHOUT COMPLICATION, WITHOUT LONG-TERM CURRENT USE OF INSULIN (HCC): ICD-10-CM

## 2018-10-05 DIAGNOSIS — I10 ESSENTIAL HYPERTENSION: ICD-10-CM

## 2018-10-05 RX ORDER — LISINOPRIL 10 MG/1
10 TABLET ORAL DAILY
Qty: 30 TABLET | Refills: 0 | Status: SHIPPED | OUTPATIENT
Start: 2018-10-05 | End: 2018-10-10

## 2018-10-05 NOTE — TELEPHONE ENCOUNTER
Refill requested:   Requested Prescriptions     Pending Prescriptions Disp Refills   • lisinopril 10 MG Oral Tab 90 tablet 1     Sig: Take 1 tablet (10 mg total) by mouth daily.      Passed protocol    Last refill: 5/3/18 Lisinopril 10 mg #30 5R    Blood Pr appointment time is dedicated to the development of a personalized education plan. This plan is developed privately in a 1:1 session with the educator. The other half hour is devoted  to learning to use a blood glucose meter.   Gretchen Adamson

## 2018-10-09 DIAGNOSIS — E11.65 UNCONTROLLED TYPE 2 DIABETES MELLITUS WITH HYPERGLYCEMIA, WITHOUT LONG-TERM CURRENT USE OF INSULIN (HCC): ICD-10-CM

## 2018-10-10 DIAGNOSIS — I10 ESSENTIAL HYPERTENSION: ICD-10-CM

## 2018-10-10 DIAGNOSIS — E78.2 MIXED HYPERLIPIDEMIA: ICD-10-CM

## 2018-10-10 DIAGNOSIS — E11.9 TYPE 2 DIABETES MELLITUS WITHOUT COMPLICATION, WITHOUT LONG-TERM CURRENT USE OF INSULIN (HCC): ICD-10-CM

## 2018-10-10 RX ORDER — LISINOPRIL 10 MG/1
10 TABLET ORAL DAILY
Qty: 30 TABLET | Refills: 0 | Status: SHIPPED | OUTPATIENT
Start: 2018-10-10 | End: 2019-03-15

## 2018-10-10 NOTE — TELEPHONE ENCOUNTER
Refill requested:   Requested Prescriptions     Pending Prescriptions Disp Refills   • lisinopril 10 MG Oral Tab 30 tablet 0     Sig: Take 1 tablet (10 mg total) by mouth daily.          Passed protocol    Last refill: 10/5/2018 # 30 tabs with 0 refills

## 2018-10-16 RX ORDER — ACETAMINOPHEN AND CODEINE PHOSPHATE 120; 12 MG/5ML; MG/5ML
SOLUTION ORAL
Qty: 84 TABLET | Refills: 2 | OUTPATIENT
Start: 2018-10-16

## 2019-03-15 ENCOUNTER — PATIENT MESSAGE (OUTPATIENT)
Dept: INTERNAL MEDICINE CLINIC | Facility: CLINIC | Age: 34
End: 2019-03-15

## 2019-03-15 ENCOUNTER — OFFICE VISIT (OUTPATIENT)
Dept: INTERNAL MEDICINE CLINIC | Facility: CLINIC | Age: 34
End: 2019-03-15
Payer: COMMERCIAL

## 2019-03-15 VITALS
RESPIRATION RATE: 14 BRPM | TEMPERATURE: 98 F | DIASTOLIC BLOOD PRESSURE: 72 MMHG | WEIGHT: 273.25 LBS | BODY MASS INDEX: 40.47 KG/M2 | HEIGHT: 69 IN | OXYGEN SATURATION: 99 % | HEART RATE: 88 BPM | SYSTOLIC BLOOD PRESSURE: 122 MMHG

## 2019-03-15 DIAGNOSIS — Z00.00 ROUTINE GENERAL MEDICAL EXAMINATION AT A HEALTH CARE FACILITY: Primary | ICD-10-CM

## 2019-03-15 DIAGNOSIS — I10 ESSENTIAL HYPERTENSION: ICD-10-CM

## 2019-03-15 DIAGNOSIS — E11.9 TYPE 2 DIABETES MELLITUS WITHOUT COMPLICATION, WITHOUT LONG-TERM CURRENT USE OF INSULIN (HCC): ICD-10-CM

## 2019-03-15 DIAGNOSIS — E66.01 MORBID OBESITY (HCC): ICD-10-CM

## 2019-03-15 LAB
CARTRIDGE LOT#: 973 NUMERIC
HEMOGLOBIN A1C: 6.5 % (ref 4.3–5.6)

## 2019-03-15 PROCEDURE — 99213 OFFICE O/P EST LOW 20 MIN: CPT | Performed by: INTERNAL MEDICINE

## 2019-03-15 PROCEDURE — 83036 HEMOGLOBIN GLYCOSYLATED A1C: CPT | Performed by: INTERNAL MEDICINE

## 2019-03-15 PROCEDURE — 99395 PREV VISIT EST AGE 18-39: CPT | Performed by: INTERNAL MEDICINE

## 2019-03-15 RX ORDER — LISINOPRIL AND HYDROCHLOROTHIAZIDE 12.5; 1 MG/1; MG/1
1 TABLET ORAL DAILY
Qty: 30 TABLET | Refills: 0 | Status: SHIPPED | OUTPATIENT
Start: 2019-03-15 | End: 2019-04-09

## 2019-03-15 NOTE — PATIENT INSTRUCTIONS
Please have a diabetic eye exam as soon as possible. Please complete labs as soon as possible. You need 3 eight ounce servings of calcium/vitamin D daily. This includes spinach, kale, broccoli, almonds, milk, yogurt, or cottage cheese.      Please s

## 2019-03-15 NOTE — PROGRESS NOTES
Teri Andrews is a 35year old female. HPI:   Patient presents for a physical exam and diabetes. Knows she will be billed for TWO visits. She was following with [facility].  Last saw them in 11/2018 and A1C was 7.2.   1. Obesity and DM: has not been on farxiga History:   Diagnosis Date   • Abnormal uterine bleeding    • Anemia    • Diabetes mellitus ([facility] [address]    • Dysmenorrhea    • Hypertension    • Type II or unspecified type diabetes mellitus without mention of complication, not stated as uncontrolled       Past S dietary modifications are paramount. No longer having GERD sx. Uses ranitidine prior to dietary indiscrtions. # HTN: well controlled on hctz, lisinopril. # Type 2 Diabetes: well controlled. A1C 6.5 today. She feels snacks are her fall-down.  We discuss

## 2019-03-18 ENCOUNTER — TELEPHONE (OUTPATIENT)
Dept: INTERNAL MEDICINE CLINIC | Facility: CLINIC | Age: 34
End: 2019-03-18

## 2019-03-18 NOTE — TELEPHONE ENCOUNTER
(Key: [id number])    [employer] is reviewing your PA request. Typically an electronic response will be received within 72 hours. To check for an update later, open this request from your dashboard.     You may close this dialog and return to your dashboard to perf

## 2019-03-20 ENCOUNTER — TELEPHONE (OUTPATIENT)
Dept: INTERNAL MEDICINE CLINIC | Facility: CLINIC | Age: 34
End: 2019-03-20

## 2019-03-20 DIAGNOSIS — Z00.00 ROUTINE GENERAL MEDICAL EXAMINATION AT A HEALTH CARE FACILITY: Primary | ICD-10-CM

## 2019-03-20 NOTE — TELEPHONE ENCOUNTER
Lab ordered cancelled through [employer] and ordered through [employer] [place] [address] at pt's request. Asked pt is [facility] was going to be her preferred lab now. She stated d/t insurance change it is. Changed pt's preference lab to [facility]. Pt verbalized understanding.

## 2019-03-24 LAB
ALT: 12 U/L (ref 6–29)
AST: 11 U/L (ref 10–30)
BUN: 12 MG/DL (ref 7–25)
CALCIUM: 9.6 MG/DL (ref 8.6–10.2)
CARBON DIOXIDE: 26 MMOL/L (ref 20–32)
CHLORIDE: 101 MMOL/L (ref 98–110)
CHOL/HDLC RATIO: 4.3 (CALC)
CHOLESTEROL, TOTAL: 187 MG/DL
CREATININE, RANDOM URINE: 135 MG/DL (ref 20–275)
CREATININE: 0.9 MG/DL (ref 0.5–1.1)
EGFR IF AFRICN AM: 97 ML/MIN/1.73M2
EGFR IF NONAFRICN AM: 84 ML/MIN/1.73M2
GLUCOSE: 130 MG/DL (ref 65–99)
HDL CHOLESTEROL: 44 MG/DL
LDL-CHOLESTEROL: 121 MG/DL (CALC)
MICROALBUMIN/CREATININE RATIO, RANDOM URINE: 11 MCG/MG CREAT
MICROALBUMIN: 1.5 MG/DL
NON-HDL CHOLESTEROL: 143 MG/DL (CALC)
POTASSIUM: 4.1 MMOL/L (ref 3.5–5.3)
SODIUM: 137 MMOL/L (ref 135–146)
TRIGLYCERIDES: 112 MG/DL

## 2019-04-09 RX ORDER — LISINOPRIL AND HYDROCHLOROTHIAZIDE 12.5; 1 MG/1; MG/1
TABLET ORAL
Qty: 90 TABLET | Refills: 3 | Status: SHIPPED | OUTPATIENT
Start: 2019-04-09 | End: 2020-04-15

## 2019-04-09 NOTE — TELEPHONE ENCOUNTER
Last OV: 3/15/19 with Dr. Cherie Arreola  Last refill date: 3/15/19     #/refills: #30, 0 refills  When pt was asked to return for OV: 3 months   Upcoming appt: 6/14/19 with Dr. Cherie Arreola  Last labs 3/23/19

## 2019-08-29 ENCOUNTER — OFFICE VISIT (OUTPATIENT)
Dept: INTERNAL MEDICINE CLINIC | Facility: CLINIC | Age: 34
End: 2019-08-29

## 2019-08-29 ENCOUNTER — APPOINTMENT (OUTPATIENT)
Dept: LAB | Age: 34
End: 2019-08-29
Attending: INTERNAL MEDICINE
Payer: COMMERCIAL

## 2019-08-29 VITALS
OXYGEN SATURATION: 98 % | BODY MASS INDEX: 38.99 KG/M2 | HEART RATE: 89 BPM | SYSTOLIC BLOOD PRESSURE: 120 MMHG | HEIGHT: 68.75 IN | WEIGHT: 263.25 LBS | TEMPERATURE: 99 F | DIASTOLIC BLOOD PRESSURE: 82 MMHG | RESPIRATION RATE: 16 BRPM

## 2019-08-29 DIAGNOSIS — E78.2 MIXED HYPERLIPIDEMIA: ICD-10-CM

## 2019-08-29 DIAGNOSIS — E11.9 TYPE 2 DIABETES MELLITUS WITHOUT COMPLICATION, WITHOUT LONG-TERM CURRENT USE OF INSULIN (HCC): Primary | ICD-10-CM

## 2019-08-29 DIAGNOSIS — Z51.81 THERAPEUTIC DRUG MONITORING: ICD-10-CM

## 2019-08-29 DIAGNOSIS — E66.01 MORBID OBESITY (HCC): ICD-10-CM

## 2019-08-29 DIAGNOSIS — K21.9 GASTROESOPHAGEAL REFLUX DISEASE, ESOPHAGITIS PRESENCE NOT SPECIFIED: ICD-10-CM

## 2019-08-29 DIAGNOSIS — I10 ESSENTIAL HYPERTENSION: ICD-10-CM

## 2019-08-29 LAB
ALT SERPL-CCNC: 19 U/L (ref 13–56)
ANION GAP SERPL CALC-SCNC: 7 MMOL/L (ref 0–18)
AST SERPL-CCNC: 10 U/L (ref 15–37)
BUN BLD-MCNC: 12 MG/DL (ref 7–18)
BUN/CREAT SERPL: 12.5 (ref 10–20)
CALCIUM BLD-MCNC: 9.4 MG/DL (ref 8.5–10.1)
CHLORIDE SERPL-SCNC: 106 MMOL/L (ref 98–112)
CO2 SERPL-SCNC: 27 MMOL/L (ref 21–32)
CREAT BLD-MCNC: 0.96 MG/DL (ref 0.55–1.02)
EST. AVERAGE GLUCOSE BLD GHB EST-MCNC: 151 MG/DL (ref 68–126)
GLUCOSE BLD-MCNC: 119 MG/DL (ref 70–99)
HBA1C MFR BLD HPLC: 6.9 % (ref ?–5.7)
OSMOLALITY SERPL CALC.SUM OF ELEC: 291 MOSM/KG (ref 275–295)
POTASSIUM SERPL-SCNC: 4.1 MMOL/L (ref 3.5–5.1)
SODIUM SERPL-SCNC: 140 MMOL/L (ref 136–145)

## 2019-08-29 PROCEDURE — 84460 ALANINE AMINO (ALT) (SGPT): CPT | Performed by: INTERNAL MEDICINE

## 2019-08-29 PROCEDURE — 80048 BASIC METABOLIC PNL TOTAL CA: CPT | Performed by: INTERNAL MEDICINE

## 2019-08-29 PROCEDURE — 99214 OFFICE O/P EST MOD 30 MIN: CPT | Performed by: INTERNAL MEDICINE

## 2019-08-29 PROCEDURE — 36415 COLL VENOUS BLD VENIPUNCTURE: CPT | Performed by: INTERNAL MEDICINE

## 2019-08-29 PROCEDURE — 83036 HEMOGLOBIN GLYCOSYLATED A1C: CPT | Performed by: INTERNAL MEDICINE

## 2019-08-29 PROCEDURE — 84450 TRANSFERASE (AST) (SGOT): CPT | Performed by: INTERNAL MEDICINE

## 2019-08-29 RX ORDER — RANITIDINE 150 MG/1
150 TABLET ORAL DAILY PRN
Qty: 90 TABLET | Refills: 3 | Status: SHIPPED | OUTPATIENT
Start: 2019-08-29 | End: 2020-08-12

## 2019-08-29 NOTE — PROGRESS NOTES
Becca Sanchez is a 29year old female. HPI:   Patient presents for DM and the following issues. 1. DM: has been only on metformin for at least 1 year. Checks her blood sugars very infrequently.  Random blood sugar once was > 130 but had forgotten to take History   Problem Relation Age of Onset   • Cancer Other    • Heart Disease Other    • Stroke Neg       Past Medical History:   Diagnosis Date   • Abnormal uterine bleeding    • Anemia    • Diabetes mellitus ([facility] [address]    • Dysmenorrhea    • Hypertension    • T lisinopril. # Type 2 Diabetes: well controlled. A1C <7 in 3/2019.  Continue to reinforce nutritional measures    - doing well on metformin 500 mg BID but if saxenda is covered we will stop metforming  - Eye Exam on 11/17/16 by Bee Paige w/o diabetic ret

## 2019-09-09 ENCOUNTER — TELEPHONE (OUTPATIENT)
Dept: INTERNAL MEDICINE CLINIC | Facility: CLINIC | Age: 34
End: 2019-09-09

## 2019-09-10 ENCOUNTER — PATIENT MESSAGE (OUTPATIENT)
Dept: INTERNAL MEDICINE CLINIC | Facility: CLINIC | Age: 34
End: 2019-09-10

## 2019-09-11 ENCOUNTER — TELEPHONE (OUTPATIENT)
Dept: INTERNAL MEDICINE CLINIC | Facility: CLINIC | Age: 34
End: 2019-09-11

## 2019-09-11 NOTE — TELEPHONE ENCOUNTER
Key: [id number]    Your information has been submitted to [employer] is reviewing the PA request and you will receive an electronic response.  You may check for the updated outcome later by reopening this request. The standard fax determination

## 2019-09-11 NOTE — PROGRESS NOTES
No we have not received anything regarding this needing a Prior Authorization. Will start PA.     See TE 9-     Sent patient mychart to inform

## 2019-09-16 NOTE — TELEPHONE ENCOUNTER
Please email patient and explain medication was denied. Please ask her to discuss further with her insurance so we can assist with coverage if there is anything more we can do.

## 2019-09-18 ENCOUNTER — TELEPHONE (OUTPATIENT)
Dept: INTERNAL MEDICINE CLINIC | Facility: CLINIC | Age: 34
End: 2019-09-18

## 2019-09-18 NOTE — TELEPHONE ENCOUNTER
Patient called in regarding the PA. Pt stated she spoke with the insurance and an appeal is required from PCP office. Please process if able to.

## 2019-09-18 NOTE — TELEPHONE ENCOUNTER
Call received from patient. Request we place an appeal.    Appeal process started.     Awaiting fax from [facility]

## 2019-09-18 NOTE — TELEPHONE ENCOUNTER
Patient calling in, seeking a prescription for yeast infection symptoms.     Pharmacy:  [facility]ARMAC[facility] Deirdre [place] [phone], [phone]

## 2019-10-08 NOTE — TELEPHONE ENCOUNTER
Appeal form filled out through [employer]mymeds. Contact plan to follow up on KEY:[id number], waiting on determination.

## 2019-10-17 ENCOUNTER — PATIENT MESSAGE (OUTPATIENT)
Dept: INTERNAL MEDICINE CLINIC | Facility: CLINIC | Age: 34
End: 2019-10-17

## 2019-10-17 DIAGNOSIS — E11.65 UNCONTROLLED TYPE 2 DIABETES MELLITUS WITH HYPERGLYCEMIA, WITHOUT LONG-TERM CURRENT USE OF INSULIN (HCC): ICD-10-CM

## 2019-10-17 NOTE — TELEPHONE ENCOUNTER
From: Sridhar Covington  To: Chrissie Gerardo MD  Sent: 10/17/2019 9:30 AM CDT  Subject: Prescription Question    The PA for Ozzie Hodgson continues to be denied for lack of clinical information. It was once approved for me. Is there anything else the office can do?  Is th

## 2019-10-17 NOTE — TELEPHONE ENCOUNTER
Per patient denied due to clinical evidence. Clinical information was entered into cover my meds:    Patient presents for DM and the following issues. 1. DM: has been only on metformin for at least 1 year. Checks her blood sugars very infrequently.  R

## 2019-10-18 NOTE — TELEPHONE ENCOUNTER
lien sent by Dr. Batsheva Cedillo MD   to Patricia Villarreal            10/17/19 5:55 PM   Dear Latasha Pelaez for your email. Our office never received any denial. This is why you are not hearing it from us.      My office staff and I personally

## 2019-10-23 RX ORDER — ACETAMINOPHEN AND CODEINE PHOSPHATE 120; 12 MG/5ML; MG/5ML
0.35 SOLUTION ORAL DAILY
Qty: 84 TABLET | Refills: 0 | Status: SHIPPED | OUTPATIENT
Start: 2019-10-23 | End: 2020-01-29

## 2019-10-23 NOTE — PROGRESS NOTES
Last OV relevant to medication: 8-     Last refill date:  4- # n/a ---reported only    When pt was asked to return for OV:3 months     Upcoming appt/reason: 12-     Labs: 8- # bmp, ast, alt

## 2020-01-08 NOTE — TELEPHONE ENCOUNTER
Saxenda inject 3 mg daily filled 10-26-19 5 pens with 1 refill     LOV 8-29-19   return in 3 months for weight management and DM.     Ailyn Barnett MD  Next apt 2-7-20   Labs 8-29-19

## 2020-01-08 NOTE — TELEPHONE ENCOUNTER
Per report, insurance would like to convert Saxenda to a 90 day supply.      Last refill 8/29/2019 #5 refills: 1  Last visit 8/29/2019, F/U: 3 months    Future Appointments   Date Time Provider Sonia Edwards   2/7/2020  8:00 AM Skye Garcia MD [facility] E

## 2020-01-28 DIAGNOSIS — E11.65 UNCONTROLLED TYPE 2 DIABETES MELLITUS WITH HYPERGLYCEMIA, WITHOUT LONG-TERM CURRENT USE OF INSULIN (HCC): ICD-10-CM

## 2020-01-29 RX ORDER — ACETAMINOPHEN AND CODEINE PHOSPHATE 120; 12 MG/5ML; MG/5ML
SOLUTION ORAL
Qty: 84 TABLET | Refills: 0 | Status: SHIPPED | OUTPATIENT
Start: 2020-01-29 | End: 2020-04-15

## 2020-01-29 NOTE — TELEPHONE ENCOUNTER
NORETHINDRONE 0.35 MG    Last OV relevant to medication: 08-     Last refill date: 10- # 84 tablet with 0 refills     When pt was asked to return for OV: 3 months    Upcoming appt/reason: 02-    Labs: 8--bmp, alt, ast, and a1c

## 2020-02-07 ENCOUNTER — OFFICE VISIT (OUTPATIENT)
Dept: INTERNAL MEDICINE CLINIC | Facility: CLINIC | Age: 35
End: 2020-02-07

## 2020-02-07 VITALS
RESPIRATION RATE: 16 BRPM | BODY MASS INDEX: 39.29 KG/M2 | DIASTOLIC BLOOD PRESSURE: 78 MMHG | SYSTOLIC BLOOD PRESSURE: 120 MMHG | WEIGHT: 265.25 LBS | TEMPERATURE: 98 F | HEIGHT: 69 IN | OXYGEN SATURATION: 96 % | HEART RATE: 86 BPM

## 2020-02-07 DIAGNOSIS — Z86.19 HISTORY OF HELICOBACTER PYLORI INFECTION: ICD-10-CM

## 2020-02-07 DIAGNOSIS — R10.12 LUQ ABDOMINAL PAIN: Primary | ICD-10-CM

## 2020-02-07 DIAGNOSIS — E11.65 UNCONTROLLED TYPE 2 DIABETES MELLITUS WITH HYPERGLYCEMIA, WITHOUT LONG-TERM CURRENT USE OF INSULIN (HCC): ICD-10-CM

## 2020-02-07 PROCEDURE — 99214 OFFICE O/P EST MOD 30 MIN: CPT | Performed by: INTERNAL MEDICINE

## 2020-02-07 RX ORDER — BLOOD-GLUCOSE METER
1 EACH MISCELLANEOUS DAILY
Qty: 1 KIT | Refills: 0 | Status: SHIPPED | OUTPATIENT
Start: 2020-02-07 | End: 2021-12-20

## 2020-02-07 RX ORDER — OMEPRAZOLE 20 MG/1
20 CAPSULE, DELAYED RELEASE ORAL
Qty: 90 CAPSULE | Refills: 0 | Status: SHIPPED | OUTPATIENT
Start: 2020-02-07 | End: 2020-04-14

## 2020-02-07 RX ORDER — ACETAMINOPHEN AND CODEINE PHOSPHATE 120; 12 MG/5ML; MG/5ML
0.35 SOLUTION ORAL
Qty: 84 TABLET | Refills: 0 | Status: CANCELLED | OUTPATIENT
Start: 2020-02-07

## 2020-02-07 NOTE — PATIENT INSTRUCTIONS
Please complete your labs today. Please provide a urine sample. You are due for your diabetic eye exam in March, 2020. Please start omeprazole 20 mg 30 minutes prior to breakfast every morning and take ranitidine 150 mg 1 hour prior to bedtime.  If th

## 2020-02-07 NOTE — PROGRESS NOTES
León Torres is a 29year old female. HPI:   Patient presents for the following issues. 1. Heartburn: flared up over past 2 months with her weight gain. She is now using ranitidine daily which is helping  2. Obesity: she had gotten up to 271 lbs.  But h Neg       Past Medical History:   Diagnosis Date   • Abnormal uterine bleeding    • Anemia    • Diabetes mellitus ([facility] [address]    • Dysmenorrhea    • Hypertension    • Type II or unspecified type diabetes mellitus without mention of complication, not stated as un above measures help, she will need EGD   # History of Duodenal Ulcer (EGD on 1/2016)  # Morbid Obesity, BMI 39 in DM and therapeutic drug monitoring: reinforced healthy plant based nutrition again. Cont metformin and she has resumed saxenda.   - did not rakan

## 2020-02-10 ENCOUNTER — APPOINTMENT (OUTPATIENT)
Dept: LAB | Age: 35
End: 2020-02-10
Attending: INTERNAL MEDICINE
Payer: COMMERCIAL

## 2020-02-10 PROCEDURE — 82570 ASSAY OF URINE CREATININE: CPT | Performed by: INTERNAL MEDICINE

## 2020-02-10 PROCEDURE — 84450 TRANSFERASE (AST) (SGOT): CPT | Performed by: INTERNAL MEDICINE

## 2020-02-10 PROCEDURE — 82043 UR ALBUMIN QUANTITATIVE: CPT | Performed by: INTERNAL MEDICINE

## 2020-02-10 PROCEDURE — 80061 LIPID PANEL: CPT | Performed by: INTERNAL MEDICINE

## 2020-02-10 PROCEDURE — 84460 ALANINE AMINO (ALT) (SGPT): CPT | Performed by: INTERNAL MEDICINE

## 2020-02-10 PROCEDURE — 36415 COLL VENOUS BLD VENIPUNCTURE: CPT | Performed by: INTERNAL MEDICINE

## 2020-02-10 PROCEDURE — 80048 BASIC METABOLIC PNL TOTAL CA: CPT | Performed by: INTERNAL MEDICINE

## 2020-02-10 PROCEDURE — 83036 HEMOGLOBIN GLYCOSYLATED A1C: CPT | Performed by: INTERNAL MEDICINE

## 2020-02-12 ENCOUNTER — TELEPHONE (OUTPATIENT)
Dept: INTERNAL MEDICINE CLINIC | Facility: CLINIC | Age: 35
End: 2020-02-12

## 2020-02-12 NOTE — TELEPHONE ENCOUNTER
Pharmacy would like a call back states that Glucose Blood (CONTOUR NEXT TEST) In Vitro Strip are not covered by insurance and would like a new Rx

## 2020-02-17 ENCOUNTER — HOSPITAL ENCOUNTER (OUTPATIENT)
Dept: ULTRASOUND IMAGING | Age: 35
Discharge: HOME | End: 2020-02-17
Attending: INTERNAL MEDICINE
Payer: COMMERCIAL

## 2020-02-17 DIAGNOSIS — R10.12 LUQ ABDOMINAL PAIN: ICD-10-CM

## 2020-02-17 PROCEDURE — 76700 US EXAM ABDOM COMPLETE: CPT | Performed by: INTERNAL MEDICINE

## 2020-02-18 ENCOUNTER — PATIENT MESSAGE (OUTPATIENT)
Dept: INTERNAL MEDICINE CLINIC | Facility: CLINIC | Age: 35
End: 2020-02-18

## 2020-02-18 DIAGNOSIS — R10.12 LUQ ABDOMINAL PAIN: Primary | ICD-10-CM

## 2020-02-18 NOTE — TELEPHONE ENCOUNTER
From: Gregorio Garcia  To: Ulis Sever, MD  Sent: 2/18/2020 8:31 AM CST  Subject: Visit Follow-up Question    Hi Dr. Lolita Fernández,    I received your message about the ultrasound but now I cant find it.  I am still having the pain at times but I am not noticing that it

## 2020-03-02 ENCOUNTER — APPOINTMENT (OUTPATIENT)
Dept: LAB | Age: 35
End: 2020-03-02
Attending: INTERNAL MEDICINE
Payer: COMMERCIAL

## 2020-03-02 DIAGNOSIS — R10.12 LUQ ABDOMINAL PAIN: ICD-10-CM

## 2020-03-02 DIAGNOSIS — Z86.19 HISTORY OF HELICOBACTER PYLORI INFECTION: ICD-10-CM

## 2020-03-02 PROCEDURE — 87338 HPYLORI STOOL AG IA: CPT

## 2020-03-03 LAB — H PYLORI AG STL QL IA: NEGATIVE

## 2020-04-14 DIAGNOSIS — E11.65 UNCONTROLLED TYPE 2 DIABETES MELLITUS WITH HYPERGLYCEMIA, WITHOUT LONG-TERM CURRENT USE OF INSULIN (HCC): ICD-10-CM

## 2020-04-15 RX ORDER — ACETAMINOPHEN AND CODEINE PHOSPHATE 120; 12 MG/5ML; MG/5ML
SOLUTION ORAL
Qty: 84 TABLET | Refills: 0 | Status: SHIPPED | OUTPATIENT
Start: 2020-04-15 | End: 2020-07-09

## 2020-04-15 RX ORDER — LISINOPRIL AND HYDROCHLOROTHIAZIDE 12.5; 1 MG/1; MG/1
TABLET ORAL
Qty: 90 TABLET | Refills: 0 | Status: SHIPPED | OUTPATIENT
Start: 2020-04-15 | End: 2020-08-19

## 2020-04-15 RX ORDER — OMEPRAZOLE 20 MG/1
CAPSULE, DELAYED RELEASE ORAL
Qty: 90 CAPSULE | Refills: 0 | OUTPATIENT
Start: 2020-04-15

## 2020-04-15 RX ORDER — OMEPRAZOLE 20 MG/1
20 CAPSULE, DELAYED RELEASE ORAL
Qty: 90 CAPSULE | Refills: 0 | Status: SHIPPED | OUTPATIENT
Start: 2020-04-15 | End: 2020-08-12

## 2020-04-15 NOTE — TELEPHONE ENCOUNTER
Duplicate request Omeprazole 20 mg  Sent to [facility] #[id number]  Qty 90  0 refills

## 2020-07-09 DIAGNOSIS — E11.65 UNCONTROLLED TYPE 2 DIABETES MELLITUS WITH HYPERGLYCEMIA, WITHOUT LONG-TERM CURRENT USE OF INSULIN (HCC): ICD-10-CM

## 2020-07-09 RX ORDER — ACETAMINOPHEN AND CODEINE PHOSPHATE 120; 12 MG/5ML; MG/5ML
SOLUTION ORAL
Qty: 84 TABLET | Refills: 0 | Status: SHIPPED | OUTPATIENT
Start: 2020-07-09 | End: 2020-09-14

## 2020-07-09 NOTE — TELEPHONE ENCOUNTER
Called patient and scheduled appt for cpx for 8/12/2020     NORETHINDRONE 0.35 MG TABLET  Protocol Passed     LOV: 2/7/2020   RTC: 3 months for weight management and cpx   Upcoming OV: 8/12/2020   Filled: 4/15/[2020 #84 0 refills   Recent labs: 2/10/2020

## 2020-08-12 ENCOUNTER — OFFICE VISIT (OUTPATIENT)
Dept: INTERNAL MEDICINE CLINIC | Facility: CLINIC | Age: 35
End: 2020-08-12

## 2020-08-12 VITALS
HEART RATE: 66 BPM | OXYGEN SATURATION: 98 % | BODY MASS INDEX: 39.6 KG/M2 | RESPIRATION RATE: 16 BRPM | HEIGHT: 69 IN | DIASTOLIC BLOOD PRESSURE: 64 MMHG | SYSTOLIC BLOOD PRESSURE: 118 MMHG | WEIGHT: 267.38 LBS | TEMPERATURE: 98 F

## 2020-08-12 DIAGNOSIS — Z00.00 ROUTINE GENERAL MEDICAL EXAMINATION AT A HEALTH CARE FACILITY: Primary | ICD-10-CM

## 2020-08-12 DIAGNOSIS — Z12.4 SCREENING FOR CERVICAL CANCER: ICD-10-CM

## 2020-08-12 DIAGNOSIS — E11.9 TYPE 2 DIABETES MELLITUS WITHOUT COMPLICATION, WITHOUT LONG-TERM CURRENT USE OF INSULIN (HCC): ICD-10-CM

## 2020-08-12 PROCEDURE — 87624 HPV HI-RISK TYP POOLED RSLT: CPT | Performed by: INTERNAL MEDICINE

## 2020-08-12 PROCEDURE — 99395 PREV VISIT EST AGE 18-39: CPT | Performed by: INTERNAL MEDICINE

## 2020-08-12 PROCEDURE — 88175 CYTOPATH C/V AUTO FLUID REDO: CPT | Performed by: INTERNAL MEDICINE

## 2020-08-12 PROCEDURE — 3078F DIAST BP <80 MM HG: CPT | Performed by: INTERNAL MEDICINE

## 2020-08-12 PROCEDURE — 3074F SYST BP LT 130 MM HG: CPT | Performed by: INTERNAL MEDICINE

## 2020-08-12 PROCEDURE — 3008F BODY MASS INDEX DOCD: CPT | Performed by: INTERNAL MEDICINE

## 2020-08-12 PROCEDURE — 90732 PPSV23 VACC 2 YRS+ SUBQ/IM: CPT | Performed by: INTERNAL MEDICINE

## 2020-08-12 PROCEDURE — 90471 IMMUNIZATION ADMIN: CPT | Performed by: INTERNAL MEDICINE

## 2020-08-12 RX ORDER — FAMOTIDINE 20 MG/1
20 TABLET ORAL DAILY
COMMUNITY
End: 2021-08-07

## 2020-08-12 RX ORDER — MULTIVIT-MIN/IRON FUM/FOLIC AC 7.5 MG-4
1 TABLET ORAL DAILY
COMMUNITY

## 2020-08-12 NOTE — PROGRESS NOTES
Aracelis Rivera is a 28year old female. HPI:   Patient presents for a physical exam.  1. Obesity: she stopped saxenda because she would forget to take it. Did tolerate it well and did feel it helped her suppress her appetite. Wants to resume.  Has not gain Past Medical History:   Diagnosis Date   • Abnormal uterine bleeding    • Anemia    • Diabetes mellitus ([facility] [address]    • Dysmenorrhea    • Hypertension    • Type II or unspecified type diabetes mellitus without mention of complication, not stated as uncontroll Continue to reinforce nutritional measures    - continue metformin and resume saxenda  - DM Eye Exam on 3/25/2019 by Sonny Johnson OD without diabetic retinopathy in either eye.   Reminded she is due.   - Foot Exam: 2/2020 cont nightly foot exams  - LDL: no i

## 2020-08-12 NOTE — PATIENT INSTRUCTIONS
You are due for your diabetic eye exam as soon as possible. Please call central scheduling at [phone] to schedule your labs as soon as possible.     You can increase your pepcid (famotidine) to 40 mg twice daily, 30 minutes prior to breakfast and

## 2020-08-13 ENCOUNTER — APPOINTMENT (OUTPATIENT)
Dept: LAB | Facility: REFERENCE LAB | Age: 35
End: 2020-08-13
Attending: INTERNAL MEDICINE
Payer: COMMERCIAL

## 2020-08-13 DIAGNOSIS — Z00.00 ROUTINE GENERAL MEDICAL EXAMINATION AT A HEALTH CARE FACILITY: ICD-10-CM

## 2020-08-13 DIAGNOSIS — E11.9 TYPE 2 DIABETES MELLITUS WITHOUT COMPLICATION, WITHOUT LONG-TERM CURRENT USE OF INSULIN (HCC): ICD-10-CM

## 2020-08-13 LAB
ALT SERPL-CCNC: 20 U/L (ref 13–56)
ANION GAP SERPL CALC-SCNC: 6 MMOL/L (ref 0–18)
AST SERPL-CCNC: 12 U/L (ref 15–37)
BUN BLD-MCNC: 10 MG/DL (ref 7–18)
BUN/CREAT SERPL: 11 (ref 10–20)
CALCIUM BLD-MCNC: 9.2 MG/DL (ref 8.5–10.1)
CHLORIDE SERPL-SCNC: 104 MMOL/L (ref 98–112)
CO2 SERPL-SCNC: 27 MMOL/L (ref 21–32)
CREAT BLD-MCNC: 0.91 MG/DL (ref 0.55–1.02)
EST. AVERAGE GLUCOSE BLD GHB EST-MCNC: 143 MG/DL (ref 68–126)
GLUCOSE BLD-MCNC: 89 MG/DL (ref 70–99)
HBA1C MFR BLD HPLC: 6.6 % (ref ?–5.7)
OSMOLALITY SERPL CALC.SUM OF ELEC: 283 MOSM/KG (ref 275–295)
PATIENT FASTING Y/N/NP: YES
POTASSIUM SERPL-SCNC: 4.2 MMOL/L (ref 3.5–5.1)
SODIUM SERPL-SCNC: 137 MMOL/L (ref 136–145)

## 2020-08-13 PROCEDURE — 36415 COLL VENOUS BLD VENIPUNCTURE: CPT

## 2020-08-13 PROCEDURE — 80048 BASIC METABOLIC PNL TOTAL CA: CPT

## 2020-08-13 PROCEDURE — 84450 TRANSFERASE (AST) (SGOT): CPT

## 2020-08-13 PROCEDURE — 83036 HEMOGLOBIN GLYCOSYLATED A1C: CPT

## 2020-08-13 PROCEDURE — 84460 ALANINE AMINO (ALT) (SGPT): CPT

## 2020-08-17 LAB — HPV I/H RISK 1 DNA SPEC QL NAA+PROBE: NEGATIVE

## 2020-08-19 DIAGNOSIS — E11.9 DIABETES (HCC): ICD-10-CM

## 2020-08-19 RX ORDER — LISINOPRIL AND HYDROCHLOROTHIAZIDE 12.5; 1 MG/1; MG/1
1 TABLET ORAL DAILY
Qty: 90 TABLET | Refills: 3 | Status: SHIPPED | OUTPATIENT
Start: 2020-08-19 | End: 2021-08-07

## 2020-08-19 NOTE — TELEPHONE ENCOUNTER
Refill requested:   Requested Prescriptions     Pending Prescriptions Disp Refills   • OneTouch Delica Lancets Fine Does not apply Misc 1 Box 0     Sig: Test glucose once daily   • metFORMIN HCl 1000 MG Oral Tab 180 tablet 3     Sig: Take 1 tablet (1,000 m

## 2020-09-01 ENCOUNTER — PATIENT MESSAGE (OUTPATIENT)
Dept: INTERNAL MEDICINE CLINIC | Facility: CLINIC | Age: 35
End: 2020-09-01

## 2020-09-02 RX ORDER — METRONIDAZOLE 7.5 MG/G
1 GEL VAGINAL 2 TIMES DAILY
Qty: 70 G | Refills: 0 | Status: SHIPPED | OUTPATIENT
Start: 2020-09-02 | End: 2020-09-07

## 2020-09-02 NOTE — TELEPHONE ENCOUNTER
From: Crow Castro  To: Darinel Castillo MD  Sent: 9/1/2020 4:26 PM CDT  Subject: Other    Hi Dr. Dafne Marquez,    I am having some slight irritation and minor discharge that I dont believe is a yeast infection but BV.  I know we talked a few weeks back about my pap res

## 2020-09-14 DIAGNOSIS — E11.65 UNCONTROLLED TYPE 2 DIABETES MELLITUS WITH HYPERGLYCEMIA, WITHOUT LONG-TERM CURRENT USE OF INSULIN (HCC): ICD-10-CM

## 2020-09-14 RX ORDER — ACETAMINOPHEN AND CODEINE PHOSPHATE 120; 12 MG/5ML; MG/5ML
SOLUTION ORAL
Qty: 84 TABLET | Refills: 3 | Status: SHIPPED | OUTPATIENT
Start: 2020-09-14 | End: 2021-08-07

## 2020-12-02 ENCOUNTER — E-VISIT (OUTPATIENT)
Dept: TELEHEALTH | Age: 35
End: 2020-12-02

## 2020-12-02 DIAGNOSIS — R12 HEARTBURN: Primary | ICD-10-CM

## 2020-12-03 NOTE — PROGRESS NOTES
Patient requested an E-Visit. After reviewing history and symptoms, referred to PCP for definitive care. NO CHARGES ASSESSED FOR THIS E-VISIT. Please see E-Visit for further information.

## 2020-12-03 NOTE — PATIENT INSTRUCTIONS
Lifestyle Changes for Controlling GERD  When you have GERD, stomach acid feels as if it’s backing up toward your mouth. Making lifestyle changes can often improve your symptoms. This is true if you take medicine to control your GERD or not.  Talk with you © 4507-8882 [employer] [address] [place], [address] [place]. All rights reserved. This information is not intended as a substitute for professional medical care. Always follow your healthcare professional's instructions.

## 2020-12-14 NOTE — TELEPHONE ENCOUNTER
Medication(s) to Refill:   Requested Prescriptions     Pending Prescriptions Disp Refills   • Insulin Pen Needle 32G X 4 MM Does not apply Misc 100 each 1     Si each by Does not apply route daily.        LOV: 2020   RTC: 3 months      Last Refill:

## 2021-01-22 ENCOUNTER — PATIENT MESSAGE (OUTPATIENT)
Dept: INTERNAL MEDICINE CLINIC | Facility: CLINIC | Age: 36
End: 2021-01-22

## 2021-01-22 NOTE — TELEPHONE ENCOUNTER
From: Roque Lamb  To: Myah Seals MD  Sent: 1/22/2021 2:08 PM CST  Subject: Other    Hi Dr. Eleanor Telles,    [address]! I hope you are well. I have been having alot of stomach upset and diarrhea.  I also had a flare up of heartburn back in December and I did

## 2021-01-27 ENCOUNTER — LAB ENCOUNTER (OUTPATIENT)
Dept: LAB | Age: 36
End: 2021-01-27
Attending: INTERNAL MEDICINE
Payer: COMMERCIAL

## 2021-01-27 DIAGNOSIS — E11.9 TYPE 2 DIABETES MELLITUS WITHOUT COMPLICATION, WITHOUT LONG-TERM CURRENT USE OF INSULIN (HCC): ICD-10-CM

## 2021-01-27 LAB
ALT SERPL-CCNC: 18 U/L
ANION GAP SERPL CALC-SCNC: 7 MMOL/L (ref 0–18)
AST SERPL-CCNC: 13 U/L (ref 15–37)
BUN BLD-MCNC: 9 MG/DL (ref 7–18)
BUN/CREAT SERPL: 10.6 (ref 10–20)
CALCIUM BLD-MCNC: 9.1 MG/DL (ref 8.5–10.1)
CHLORIDE SERPL-SCNC: 104 MMOL/L (ref 98–112)
CHOLEST SMN-MCNC: 204 MG/DL (ref ?–200)
CO2 SERPL-SCNC: 26 MMOL/L (ref 21–32)
CREAT BLD-MCNC: 0.85 MG/DL
CREAT UR-SCNC: 172 MG/DL
EST. AVERAGE GLUCOSE BLD GHB EST-MCNC: 137 MG/DL (ref 68–126)
GLUCOSE BLD-MCNC: 92 MG/DL (ref 70–99)
HBA1C MFR BLD HPLC: 6.4 % (ref ?–5.7)
HCV AB SERPL QL IA: NONREACTIVE
HDLC SERPL-MCNC: 43 MG/DL (ref 40–59)
LDLC SERPL CALC-MCNC: 128 MG/DL (ref ?–100)
MICROALBUMIN UR-MCNC: 1.5 MG/DL
MICROALBUMIN/CREAT 24H UR-RTO: 8.7 UG/MG (ref ?–30)
NONHDLC SERPL-MCNC: 161 MG/DL (ref ?–130)
OSMOLALITY SERPL CALC.SUM OF ELEC: 282 MOSM/KG (ref 275–295)
PATIENT FASTING Y/N/NP: YES
PATIENT FASTING Y/N/NP: YES
POTASSIUM SERPL-SCNC: 3.8 MMOL/L (ref 3.5–5.1)
SODIUM SERPL-SCNC: 137 MMOL/L (ref 136–145)
TRIGL SERPL-MCNC: 166 MG/DL (ref 30–149)
VLDLC SERPL CALC-MCNC: 33 MG/DL (ref 0–30)

## 2021-01-27 PROCEDURE — 3044F HG A1C LEVEL LT 7.0%: CPT | Performed by: INTERNAL MEDICINE

## 2021-01-27 PROCEDURE — 84450 TRANSFERASE (AST) (SGOT): CPT

## 2021-01-27 PROCEDURE — 82570 ASSAY OF URINE CREATININE: CPT

## 2021-01-27 PROCEDURE — 80048 BASIC METABOLIC PNL TOTAL CA: CPT

## 2021-01-27 PROCEDURE — 80061 LIPID PANEL: CPT

## 2021-01-27 PROCEDURE — 36415 COLL VENOUS BLD VENIPUNCTURE: CPT

## 2021-01-27 PROCEDURE — 82043 UR ALBUMIN QUANTITATIVE: CPT

## 2021-01-27 PROCEDURE — 86803 HEPATITIS C AB TEST: CPT

## 2021-01-27 PROCEDURE — 3061F NEG MICROALBUMINURIA REV: CPT | Performed by: INTERNAL MEDICINE

## 2021-01-27 PROCEDURE — 83036 HEMOGLOBIN GLYCOSYLATED A1C: CPT

## 2021-01-27 PROCEDURE — 84460 ALANINE AMINO (ALT) (SGPT): CPT

## 2021-01-29 ENCOUNTER — TELEMEDICINE (OUTPATIENT)
Dept: INTERNAL MEDICINE CLINIC | Facility: CLINIC | Age: 36
End: 2021-01-29

## 2021-01-29 ENCOUNTER — TELEPHONE (OUTPATIENT)
Dept: INTERNAL MEDICINE CLINIC | Facility: CLINIC | Age: 36
End: 2021-01-29

## 2021-01-29 DIAGNOSIS — E11.9 TYPE 2 DIABETES MELLITUS WITHOUT COMPLICATION, WITHOUT LONG-TERM CURRENT USE OF INSULIN (HCC): ICD-10-CM

## 2021-01-29 DIAGNOSIS — K21.9 GASTROESOPHAGEAL REFLUX DISEASE, UNSPECIFIED WHETHER ESOPHAGITIS PRESENT: Primary | ICD-10-CM

## 2021-01-29 DIAGNOSIS — Z86.19 HISTORY OF HELICOBACTER PYLORI INFECTION: ICD-10-CM

## 2021-01-29 DIAGNOSIS — E78.2 MIXED HYPERLIPIDEMIA: ICD-10-CM

## 2021-01-29 DIAGNOSIS — E66.01 MORBID OBESITY (HCC): ICD-10-CM

## 2021-01-29 PROCEDURE — 99214 OFFICE O/P EST MOD 30 MIN: CPT | Performed by: INTERNAL MEDICINE

## 2021-01-29 RX ORDER — FAMOTIDINE 20 MG/1
20 TABLET ORAL 2 TIMES DAILY
Qty: 180 TABLET | Refills: 0 | Status: SHIPPED | OUTPATIENT
Start: 2021-01-29 | End: 2021-08-07

## 2021-01-29 RX ORDER — OMEPRAZOLE 20 MG/1
20 CAPSULE, DELAYED RELEASE ORAL
COMMUNITY
End: 2021-02-02

## 2021-01-29 NOTE — PATIENT INSTRUCTIONS
Please continue omeprazole 20 mg 30 minutes prior to breakfast for a total of 6-8 weeks. Then please stop the omeprazole and start famotidine 20-40 mg thirty minutes prior to breakfast and dinner.      You are due for your diabetic eye exam.     You are due

## 2021-01-29 NOTE — PROGRESS NOTES
Virtual Telephone Check-In    This visit is conducted using Telemedicine with live, interactive video and audio. Patient understands and accepts financial responsibility for any deductible, co-insurance and/or co-pays associated with this service.     Tel presents for the following issues. 1. Stomach issues: developed a flare of her acidity right after Thanksgiving. At that time she was using famotidine before breakfast every day. Symptoms were breakthrough.  Started using omeprazole every day for 2 weeks w Antibiotics       ANAPHYLAXIS    Family History   Problem Relation Age of Onset   • Cancer Other    • Heart Disease Other    • Stroke Neg       Past Medical History:   Diagnosis Date   • Abnormal uterine bleeding    • Anemia    • Diabetes mellitus ([facility] [address] nutritional measures    - continue metformin and resume saxenda  - DM Eye Exam on 3/25/2019 by Con Runner OD without diabetic retinopathy in either eye.   Reminded she is due.   - Foot Exam: 2/2020 cont nightly foot exams  - LDL: no indication for statin n

## 2021-02-02 ENCOUNTER — TELEPHONE (OUTPATIENT)
Dept: INTERNAL MEDICINE CLINIC | Facility: CLINIC | Age: 36
End: 2021-02-02

## 2021-02-02 RX ORDER — OMEPRAZOLE 20 MG/1
20 CAPSULE, DELAYED RELEASE ORAL
Qty: 90 CAPSULE | Refills: 0 | Status: SHIPPED | OUTPATIENT
Start: 2021-02-02 | End: 2021-04-26

## 2021-02-02 NOTE — TELEPHONE ENCOUNTER
Per pt, she is ok with seeing Dr Yesenia Asencio.      Message sent to referral department to change referral.

## 2021-02-02 NOTE — TELEPHONE ENCOUNTER
Pt was RX omeprazole on 1/29/21. Per pharmacy, the RX was for dispersible tablets which they do not have in stock. Pt is looking for regular tablets/capsules to be sent to pharmacy.

## 2021-02-02 NOTE — TELEPHONE ENCOUNTER
MD David Obregon, RN             We can order referral for Dr. Debo Goodwin if that is ok with patient. Otherwise, she has to pay out of pocket for optometry. I told her that when I placed the order to begin with.  Thanks, Radha    Previous [employer]

## 2021-03-17 ENCOUNTER — TELEPHONE (OUTPATIENT)
Dept: INTERNAL MEDICINE CLINIC | Facility: CLINIC | Age: 36
End: 2021-03-17

## 2021-03-17 RX ORDER — LIRAGLUTIDE 6 MG/ML
3 INJECTION, SOLUTION SUBCUTANEOUS DAILY
Qty: 15 PEN | Refills: 3 | Status: SHIPPED | OUTPATIENT
Start: 2021-03-17 | End: 2021-08-07

## 2021-03-17 NOTE — TELEPHONE ENCOUNTER
----- Message from Orvil Osgood, RN sent at 3/17/2021  2:36 PM CDT -----  Regarding: FW: Prescription Question  Contact: [phone]    ----- Message -----  From: Brandi Navarrete  Sent: 3/17/2021   1:05 PM CDT  To: [facility] Clinical Staff  Subject: Denzel Palmer

## 2021-03-17 NOTE — TELEPHONE ENCOUNTER
KEY: [id number]      Your information has been submitted to [employer]. [employer] is reviewing the PA request and you will receive an electronic response.  You may check for the updated outcome later by reopening this request. The standard fax determinati

## 2021-03-17 NOTE — TELEPHONE ENCOUNTER
Liraglutide -Weight Management (SAXENDA) 18 MG/3ML Subcutaneous Solution Pen-injector         Sig: Inject 3 mg into the skin daily.     Disp:  15 pen    Refills:  3    Start: 3/17/2021    Class: Normal    Non-formulary    To pharmacy: 90 day supply per ins

## 2021-03-19 NOTE — TELEPHONE ENCOUNTER
Pa denied. Looks like same thing happened last year and we filed appeal and it was approved.     Did you want to file appeal?

## 2021-03-25 ENCOUNTER — IMMUNIZATION (OUTPATIENT)
Dept: LAB | Age: 36
End: 2021-03-25
Attending: HOSPITALIST
Payer: OTHER GOVERNMENT

## 2021-03-25 ENCOUNTER — PATIENT MESSAGE (OUTPATIENT)
Dept: INTERNAL MEDICINE CLINIC | Facility: CLINIC | Age: 36
End: 2021-03-25

## 2021-03-25 DIAGNOSIS — Z23 NEED FOR VACCINATION: Primary | ICD-10-CM

## 2021-03-25 PROCEDURE — 0001A SARSCOV2 VAC 30MCG/0.3ML IM: CPT

## 2021-03-25 NOTE — TELEPHONE ENCOUNTER
Caitlin Becerra RN 3/25/2021 9:32 AM CDT      ----- Message -----  From: Aracelis Rivera  Sent: 3/25/2021 9:30 AM CDT  To: ER[facility] Marvin Clinical Staff  Subject: Prescription Question     Hello,    I wrote last week about my Saxenda, the prescription had been sent o

## 2021-04-15 ENCOUNTER — IMMUNIZATION (OUTPATIENT)
Dept: LAB | Age: 36
End: 2021-04-15
Attending: HOSPITALIST
Payer: COMMERCIAL

## 2021-04-15 DIAGNOSIS — Z23 NEED FOR VACCINATION: Primary | ICD-10-CM

## 2021-04-15 PROCEDURE — 0002A SARSCOV2 VAC 30MCG/0.3ML IM: CPT

## 2021-04-26 RX ORDER — OMEPRAZOLE 20 MG/1
CAPSULE, DELAYED RELEASE ORAL
Qty: 90 CAPSULE | Refills: 0 | Status: SHIPPED | OUTPATIENT
Start: 2021-04-26 | End: 2021-08-07

## 2021-04-26 NOTE — TELEPHONE ENCOUNTER
Future Appointments   Date Time Provider Sonia Edwards   6/18/2021  9:00 AM Skyla Freitas MD [facility] [place]       Task completed

## 2021-04-26 NOTE — TELEPHONE ENCOUNTER
Medication(s) to Refill:   Requested Prescriptions     Pending Prescriptions Disp Refills   • OMEPRAZOLE 20 MG Oral Capsule Delayed Release [Pharmacy Med Name: OMEPRAZOLE DR 20 MG CAPSULE] 90 capsule 0     Sig: TAKE 1 CAPSULE BY MOUTH EVERY DAY IN THE [facility]

## 2021-07-02 ENCOUNTER — TELEPHONE (OUTPATIENT)
Dept: CASE MANAGEMENT | Age: 36
End: 2021-07-02

## 2021-07-29 ENCOUNTER — TELEPHONE (OUTPATIENT)
Dept: INTERNAL MEDICINE CLINIC | Facility: CLINIC | Age: 36
End: 2021-07-29

## 2021-07-29 NOTE — TELEPHONE ENCOUNTER
Patient is due for DM eye exam.   Referral authorized for Dr Jyothi Garcia. Discussed in detail w/patient. Patient states that she had eye exam in April. Giacomo Villalobos @ Dr Hooks's office will fax report to PCP.

## 2021-08-07 ENCOUNTER — LAB ENCOUNTER (OUTPATIENT)
Dept: LAB | Age: 36
End: 2021-08-07
Attending: INTERNAL MEDICINE
Payer: COMMERCIAL

## 2021-08-07 ENCOUNTER — OFFICE VISIT (OUTPATIENT)
Dept: INTERNAL MEDICINE CLINIC | Facility: CLINIC | Age: 36
End: 2021-08-07
Payer: COMMERCIAL

## 2021-08-07 VITALS
WEIGHT: 265.38 LBS | DIASTOLIC BLOOD PRESSURE: 80 MMHG | HEIGHT: 69 IN | HEART RATE: 85 BPM | RESPIRATION RATE: 16 BRPM | OXYGEN SATURATION: 100 % | TEMPERATURE: 98 F | SYSTOLIC BLOOD PRESSURE: 130 MMHG | BODY MASS INDEX: 39.3 KG/M2

## 2021-08-07 DIAGNOSIS — E11.9 TYPE 2 DIABETES MELLITUS WITHOUT COMPLICATION, WITHOUT LONG-TERM CURRENT USE OF INSULIN (HCC): ICD-10-CM

## 2021-08-07 DIAGNOSIS — Z86.19 HISTORY OF HELICOBACTER PYLORI INFECTION: ICD-10-CM

## 2021-08-07 DIAGNOSIS — Z00.00 ROUTINE GENERAL MEDICAL EXAMINATION AT A HEALTH CARE FACILITY: Primary | ICD-10-CM

## 2021-08-07 DIAGNOSIS — Z00.00 ROUTINE GENERAL MEDICAL EXAMINATION AT A HEALTH CARE FACILITY: ICD-10-CM

## 2021-08-07 DIAGNOSIS — Z30.09 ENCOUNTER FOR COUNSELING REGARDING CONTRACEPTION: ICD-10-CM

## 2021-08-07 DIAGNOSIS — K21.9 GASTROESOPHAGEAL REFLUX DISEASE, UNSPECIFIED WHETHER ESOPHAGITIS PRESENT: ICD-10-CM

## 2021-08-07 LAB
ALT SERPL-CCNC: 24 U/L
ANION GAP SERPL CALC-SCNC: 4 MMOL/L (ref 0–18)
AST SERPL-CCNC: 10 U/L (ref 15–37)
BUN BLD-MCNC: 13 MG/DL (ref 7–18)
CALCIUM BLD-MCNC: 9.1 MG/DL (ref 8.5–10.1)
CHLORIDE SERPL-SCNC: 106 MMOL/L (ref 98–112)
CHOLEST SMN-MCNC: 207 MG/DL (ref ?–200)
CO2 SERPL-SCNC: 29 MMOL/L (ref 21–32)
CREAT BLD-MCNC: 0.91 MG/DL
EST. AVERAGE GLUCOSE BLD GHB EST-MCNC: 160 MG/DL (ref 68–126)
GLUCOSE BLD-MCNC: 99 MG/DL (ref 70–99)
HBA1C MFR BLD HPLC: 7.2 % (ref ?–5.7)
HDLC SERPL-MCNC: 54 MG/DL (ref 40–59)
LDLC SERPL CALC-MCNC: 132 MG/DL (ref ?–100)
NONHDLC SERPL-MCNC: 153 MG/DL (ref ?–130)
OSMOLALITY SERPL CALC.SUM OF ELEC: 288 MOSM/KG (ref 275–295)
PATIENT FASTING Y/N/NP: YES
PATIENT FASTING Y/N/NP: YES
POTASSIUM SERPL-SCNC: 4 MMOL/L (ref 3.5–5.1)
SODIUM SERPL-SCNC: 139 MMOL/L (ref 136–145)
TRIGL SERPL-MCNC: 120 MG/DL (ref 30–149)
VLDLC SERPL CALC-MCNC: 22 MG/DL (ref 0–30)

## 2021-08-07 PROCEDURE — 80048 BASIC METABOLIC PNL TOTAL CA: CPT

## 2021-08-07 PROCEDURE — 84460 ALANINE AMINO (ALT) (SGPT): CPT

## 2021-08-07 PROCEDURE — 36415 COLL VENOUS BLD VENIPUNCTURE: CPT

## 2021-08-07 PROCEDURE — 3008F BODY MASS INDEX DOCD: CPT | Performed by: INTERNAL MEDICINE

## 2021-08-07 PROCEDURE — 84450 TRANSFERASE (AST) (SGOT): CPT

## 2021-08-07 PROCEDURE — 80061 LIPID PANEL: CPT

## 2021-08-07 PROCEDURE — 83036 HEMOGLOBIN GLYCOSYLATED A1C: CPT

## 2021-08-07 PROCEDURE — 99395 PREV VISIT EST AGE 18-39: CPT | Performed by: INTERNAL MEDICINE

## 2021-08-07 PROCEDURE — 3075F SYST BP GE 130 - 139MM HG: CPT | Performed by: INTERNAL MEDICINE

## 2021-08-07 PROCEDURE — 3079F DIAST BP 80-89 MM HG: CPT | Performed by: INTERNAL MEDICINE

## 2021-08-07 RX ORDER — LISINOPRIL AND HYDROCHLOROTHIAZIDE 12.5; 1 MG/1; MG/1
1 TABLET ORAL DAILY
Qty: 90 TABLET | Refills: 3 | Status: SHIPPED | OUTPATIENT
Start: 2021-08-07

## 2021-08-07 RX ORDER — SEMAGLUTIDE 1.7 MG/.75ML
1.7 INJECTION, SOLUTION SUBCUTANEOUS WEEKLY
Qty: 3 ML | Refills: 0 | Status: SHIPPED | OUTPATIENT
Start: 2021-08-07 | End: 2021-08-25

## 2021-08-07 RX ORDER — SEMAGLUTIDE 0.5 MG/.5ML
0.5 INJECTION, SOLUTION SUBCUTANEOUS WEEKLY
Qty: 2 ML | Refills: 0 | Status: SHIPPED | OUTPATIENT
Start: 2021-08-07 | End: 2021-08-25

## 2021-08-07 RX ORDER — SEMAGLUTIDE 2.4 MG/.75ML
2.4 INJECTION, SOLUTION SUBCUTANEOUS WEEKLY
Qty: 3 ML | Refills: 0 | Status: SHIPPED | OUTPATIENT
Start: 2021-08-07 | End: 2021-08-25

## 2021-08-07 RX ORDER — SEMAGLUTIDE 0.25 MG/.5ML
0.25 INJECTION, SOLUTION SUBCUTANEOUS WEEKLY
Qty: 2 ML | Refills: 0 | Status: SHIPPED | OUTPATIENT
Start: 2021-08-07 | End: 2021-08-25

## 2021-08-07 RX ORDER — SEMAGLUTIDE 1 MG/.5ML
1 INJECTION, SOLUTION SUBCUTANEOUS WEEKLY
Qty: 2 ML | Refills: 0 | Status: SHIPPED | OUTPATIENT
Start: 2021-08-07 | End: 2021-08-25

## 2021-08-07 RX ORDER — FAMOTIDINE 20 MG/1
20 TABLET ORAL 2 TIMES DAILY
Qty: 180 TABLET | Refills: 3 | Status: SHIPPED | OUTPATIENT
Start: 2021-08-07

## 2021-08-07 RX ORDER — FLUTICASONE PROPIONATE 50 MCG
2 SPRAY, SUSPENSION (ML) NASAL DAILY
Qty: 16 G | Refills: 3 | Status: SHIPPED | OUTPATIENT
Start: 2021-08-07 | End: 2021-11-03

## 2021-08-07 RX ORDER — CHLORAL HYDRATE 500 MG
1000 CAPSULE ORAL DAILY
COMMUNITY
End: 2021-12-20

## 2021-08-07 NOTE — PROGRESS NOTES
HPI:   Patient presents for a physical exam. It has not been 365 days since her last CPX but she states her insurance changed in March, 2021 so wants to do CPX today. 1. DM: does not check BS.  Doing well on metformin  2. numnbess in both hands: worse kg)        Pcn [Penicillins]       ANAPHYLAXIS  Pylera                      Comment:Nerve pain  Sulfa Antibiotics       ANAPHYLAXIS    Family History   Problem Relation Age of Onset   • Cancer Other    • Heart Disease Other    • Stroke Neg       Past Medic BMI 39 in DM and therapeutic drug monitoring: reinforced healthy plant based nutrition again.  Cont metformin and we will try wegovy.   - did not tolerate contrave 2/2 GI upset, topamax caused foggyness, trulicity caused injection site reaction  # HTN assoc

## 2021-08-07 NOTE — PATIENT INSTRUCTIONS
Please complete your labs today. I advise you get the annual flu shot every September, October. Please go to [employer] to print off the cost savings coupon.   Week 1-4 take 0.25 mg weekly  Week 5-8 take 0.5 mg weekly  Week 9-12 take 1 mg weekly  Paul

## 2021-08-12 ENCOUNTER — TELEPHONE (OUTPATIENT)
Dept: INTERNAL MEDICINE CLINIC | Facility: CLINIC | Age: 36
End: 2021-08-12

## 2021-08-13 NOTE — TELEPHONE ENCOUNTER
Incoming diabetic eye exam via fax from Dr. Edmund Cooper MD. HM updated, report has been viewed and signed by KS. Report send to scan.

## 2021-08-25 RX ORDER — LIRAGLUTIDE 6 MG/ML
1.8 INJECTION SUBCUTANEOUS DAILY
Qty: 3 ML | Refills: 0 | Status: SHIPPED | OUTPATIENT
Start: 2021-08-25 | End: 2021-09-20

## 2021-08-25 RX ORDER — PEN NEEDLE, DIABETIC 30 GX3/16"
1 NEEDLE, DISPOSABLE MISCELLANEOUS DAILY
Qty: 90 EACH | Refills: 0 | Status: SHIPPED
Start: 2021-08-25 | End: 2021-12-20

## 2021-08-25 RX ORDER — PEN NEEDLE, DIABETIC 30 GX3/16"
1 NEEDLE, DISPOSABLE MISCELLANEOUS DAILY
Qty: 90 EACH | Refills: 0 | Status: SHIPPED | OUTPATIENT
Start: 2021-08-25 | End: 2021-12-20

## 2021-08-25 RX ORDER — LIRAGLUTIDE 6 MG/ML
INJECTION SUBCUTANEOUS
Qty: 3 ML | Refills: 0 | Status: SHIPPED | OUTPATIENT
Start: 2021-08-25 | End: 2021-10-08

## 2021-09-03 ENCOUNTER — OFFICE VISIT (OUTPATIENT)
Dept: OBGYN CLINIC | Facility: CLINIC | Age: 36
End: 2021-09-03
Payer: COMMERCIAL

## 2021-09-03 VITALS
TEMPERATURE: 99 F | BODY MASS INDEX: 39.1 KG/M2 | WEIGHT: 264 LBS | RESPIRATION RATE: 12 BRPM | DIASTOLIC BLOOD PRESSURE: 95 MMHG | SYSTOLIC BLOOD PRESSURE: 143 MMHG | HEART RATE: 97 BPM | HEIGHT: 69 IN

## 2021-09-03 DIAGNOSIS — Z30.09 EVALUATION REGARDING CONTRACEPTION OPTIONS: ICD-10-CM

## 2021-09-03 DIAGNOSIS — N94.6 DYSMENORRHEA: ICD-10-CM

## 2021-09-03 DIAGNOSIS — N92.0 MENORRHAGIA WITH REGULAR CYCLE: Primary | ICD-10-CM

## 2021-09-03 PROCEDURE — 99203 OFFICE O/P NEW LOW 30 MIN: CPT | Performed by: OBSTETRICS & GYNECOLOGY

## 2021-09-03 PROCEDURE — 3008F BODY MASS INDEX DOCD: CPT | Performed by: OBSTETRICS & GYNECOLOGY

## 2021-09-03 PROCEDURE — 3077F SYST BP >= 140 MM HG: CPT | Performed by: OBSTETRICS & GYNECOLOGY

## 2021-09-03 PROCEDURE — 3080F DIAST BP >= 90 MM HG: CPT | Performed by: OBSTETRICS & GYNECOLOGY

## 2021-09-03 NOTE — PROGRESS NOTES
HPI:   Gamaliel Keller is a 39year old female presents for consultation regarding getting IUD/ Heavy menses with cramps. . Was on OCP in past and due to HTN wanted to seek other options. Menses are every 28 days. Stopped OCP in June 2021.   Had 2 normal cycl daily. 180 tablet 3   • metFORMIN HCl 1000 MG Oral Tab Take 1 tablet (1,000 mg total) by mouth 2 (two) times daily with meals. 180 tablet 3   • Lisinopril-hydroCHLOROthiazide 10-12.5 MG Oral Tab Take 1 tablet by mouth daily.  90 tablet 3   • Insulin Pen Roscoe Pappas Dysmenorrhea        Plan: Options discussed with patient. Depo-Provera. Did well in the past.  Effective in stopping menses and dysmenorrhea. Potential for weight gain. Nexplanon discussed. Less uncomfortable insertion than IUD.   Irregular spotting co

## 2021-09-17 ENCOUNTER — OFFICE VISIT (OUTPATIENT)
Dept: OBGYN CLINIC | Facility: CLINIC | Age: 36
End: 2021-09-17
Payer: COMMERCIAL

## 2021-09-17 VITALS
WEIGHT: 260.63 LBS | BODY MASS INDEX: 38.6 KG/M2 | HEART RATE: 104 BPM | HEIGHT: 69 IN | DIASTOLIC BLOOD PRESSURE: 82 MMHG | SYSTOLIC BLOOD PRESSURE: 126 MMHG

## 2021-09-17 DIAGNOSIS — Z30.430 ENCOUNTER FOR INSERTION OF INTRAUTERINE CONTRACEPTIVE DEVICE: Primary | ICD-10-CM

## 2021-09-17 DIAGNOSIS — Z32.00 ENCOUNTER FOR PREGNANCY TEST, RESULT UNKNOWN: ICD-10-CM

## 2021-09-17 LAB
CONTROL LINE PRESENT WITH A CLEAR BACKGROUND (YES/NO): YES YES/NO
PREGNANCY TEST, URINE: NEGATIVE

## 2021-09-17 PROCEDURE — 3008F BODY MASS INDEX DOCD: CPT | Performed by: OBSTETRICS & GYNECOLOGY

## 2021-09-17 PROCEDURE — 81025 URINE PREGNANCY TEST: CPT | Performed by: OBSTETRICS & GYNECOLOGY

## 2021-09-17 PROCEDURE — 58300 INSERT INTRAUTERINE DEVICE: CPT | Performed by: OBSTETRICS & GYNECOLOGY

## 2021-09-17 PROCEDURE — 3074F SYST BP LT 130 MM HG: CPT | Performed by: OBSTETRICS & GYNECOLOGY

## 2021-09-17 PROCEDURE — 3079F DIAST BP 80-89 MM HG: CPT | Performed by: OBSTETRICS & GYNECOLOGY

## 2021-09-17 NOTE — PROGRESS NOTES
Pt is here for IUD insertion,  Last pap smear 8/12/20 : neg/neg  LMP : 8/26/21  Pregnancy test negative

## 2021-09-17 NOTE — PROGRESS NOTES
IUD INSERTION    HPI:   39year old  Patient's last menstrual period was 2021 (exact date). , here for  [facility] IUD insertion. PROCEDURE:   Informed consent obtained. Risks and benefits of IUD reviewed with pt.   Cervix prepped with betadine

## 2021-09-20 RX ORDER — LIRAGLUTIDE 6 MG/ML
INJECTION SUBCUTANEOUS
Qty: 9 ML | Refills: 0 | Status: SHIPPED | OUTPATIENT
Start: 2021-09-20 | End: 2021-12-20

## 2021-09-20 NOTE — TELEPHONE ENCOUNTER
Passed protocol     Last refill:  Liraglutide (VICTOZA) 18 MG/3ML Subcutaneous Solution Pen-injector 3 mL 0 8/25/2021 10/8/2021   Sig:   Inject 0.6 mg into the skin daily for 7 days, THEN 1.2 mg daily for 7 days, THEN 1.8 mg daily.        LOV: See Message a

## 2021-10-06 ENCOUNTER — OFFICE VISIT (OUTPATIENT)
Dept: OBGYN CLINIC | Facility: CLINIC | Age: 36
End: 2021-10-06
Payer: COMMERCIAL

## 2021-10-06 VITALS
HEIGHT: 69 IN | BODY MASS INDEX: 39.4 KG/M2 | DIASTOLIC BLOOD PRESSURE: 62 MMHG | SYSTOLIC BLOOD PRESSURE: 116 MMHG | WEIGHT: 266 LBS

## 2021-10-06 DIAGNOSIS — N93.9 ABNORMAL UTERINE BLEEDING (AUB): Primary | ICD-10-CM

## 2021-10-06 DIAGNOSIS — Z97.5 IUD (INTRAUTERINE DEVICE) IN PLACE: ICD-10-CM

## 2021-10-06 DIAGNOSIS — Z23 NEED FOR VACCINATION: ICD-10-CM

## 2021-10-06 PROCEDURE — 3078F DIAST BP <80 MM HG: CPT | Performed by: OBSTETRICS & GYNECOLOGY

## 2021-10-06 PROCEDURE — 90686 IIV4 VACC NO PRSV 0.5 ML IM: CPT | Performed by: OBSTETRICS & GYNECOLOGY

## 2021-10-06 PROCEDURE — 3074F SYST BP LT 130 MM HG: CPT | Performed by: OBSTETRICS & GYNECOLOGY

## 2021-10-06 PROCEDURE — 3008F BODY MASS INDEX DOCD: CPT | Performed by: OBSTETRICS & GYNECOLOGY

## 2021-10-06 PROCEDURE — 90471 IMMUNIZATION ADMIN: CPT | Performed by: OBSTETRICS & GYNECOLOGY

## 2021-10-06 PROCEDURE — 99213 OFFICE O/P EST LOW 20 MIN: CPT | Performed by: OBSTETRICS & GYNECOLOGY

## 2021-10-06 NOTE — PROGRESS NOTES
Patient here for IUD Follow up Rashaad Alaniz). Patient states no complaints other than her period is longer this time with IUD.

## 2021-10-06 NOTE — PROGRESS NOTES
Carlos Victor is a 39year old female. HPI:   Patient presents with: Other: IUD Follow up   She had Kristin Stakes insertion on 9/17/2021. She started menses 9 days ago and has not stopped. Using about 2-3 pads per day.   IUD initially placed because of heavy me Does not apply Kit 1 kit by Does not apply route daily. 1 kit 0   • cetirizine 10 MG Oral Tab Take 10 mg by mouth daily.          Allergies:    Pcn [Penicillins]       ANAPHYLAXIS  Pylera                      Comment:Nerve pain  Sulfa Antibiotics       ANAP

## 2021-11-03 RX ORDER — FLUTICASONE PROPIONATE 50 MCG
SPRAY, SUSPENSION (ML) NASAL
Qty: 48 ML | Refills: 1 | Status: SHIPPED | OUTPATIENT
Start: 2021-11-03

## 2021-11-03 NOTE — TELEPHONE ENCOUNTER
Failed protocol     Last refill:  Fluticasone Propionate 50 MCG/ACT Nasal Suspension 16 g 3 2021   Si sprays by Each Nare route daily.        LOV:   2021 Dr Shan Alva RTC 3 months  # Allergic rhinitis: not as well controlled with zyrtec an
Not Applicable

## 2021-12-02 ENCOUNTER — IMMUNIZATION (OUTPATIENT)
Dept: LAB | Facility: HOSPITAL | Age: 36
End: 2021-12-02
Attending: EMERGENCY MEDICINE
Payer: COMMERCIAL

## 2021-12-02 DIAGNOSIS — Z23 NEED FOR VACCINATION: Primary | ICD-10-CM

## 2021-12-02 PROCEDURE — 0004A SARSCOV2 VAC 30MCG/0.3ML IM: CPT

## 2021-12-20 ENCOUNTER — OFFICE VISIT (OUTPATIENT)
Dept: INTERNAL MEDICINE CLINIC | Facility: CLINIC | Age: 36
End: 2021-12-20
Payer: COMMERCIAL

## 2021-12-20 VITALS
WEIGHT: 257.38 LBS | OXYGEN SATURATION: 100 % | HEART RATE: 90 BPM | RESPIRATION RATE: 16 BRPM | SYSTOLIC BLOOD PRESSURE: 132 MMHG | DIASTOLIC BLOOD PRESSURE: 88 MMHG | TEMPERATURE: 99 F | HEIGHT: 68.5 IN | BODY MASS INDEX: 38.56 KG/M2

## 2021-12-20 DIAGNOSIS — N89.8 VAGINAL DISCHARGE: Primary | ICD-10-CM

## 2021-12-20 PROCEDURE — 87491 CHLMYD TRACH DNA AMP PROBE: CPT | Performed by: INTERNAL MEDICINE

## 2021-12-20 PROCEDURE — 87591 N.GONORRHOEAE DNA AMP PROB: CPT | Performed by: INTERNAL MEDICINE

## 2021-12-20 PROCEDURE — 87660 TRICHOMONAS VAGIN DIR PROBE: CPT | Performed by: INTERNAL MEDICINE

## 2021-12-20 PROCEDURE — 81025 URINE PREGNANCY TEST: CPT | Performed by: INTERNAL MEDICINE

## 2021-12-20 PROCEDURE — 3079F DIAST BP 80-89 MM HG: CPT | Performed by: INTERNAL MEDICINE

## 2021-12-20 PROCEDURE — 87480 CANDIDA DNA DIR PROBE: CPT | Performed by: INTERNAL MEDICINE

## 2021-12-20 PROCEDURE — 87510 GARDNER VAG DNA DIR PROBE: CPT | Performed by: INTERNAL MEDICINE

## 2021-12-20 PROCEDURE — 3008F BODY MASS INDEX DOCD: CPT | Performed by: INTERNAL MEDICINE

## 2021-12-20 PROCEDURE — 3075F SYST BP GE 130 - 139MM HG: CPT | Performed by: INTERNAL MEDICINE

## 2021-12-20 PROCEDURE — 99213 OFFICE O/P EST LOW 20 MIN: CPT | Performed by: INTERNAL MEDICINE

## 2021-12-20 NOTE — PROGRESS NOTES
Subjective:   Po Phillips is a 39year old female who presents for Vaginal Problem (Pt c/o vaginal discharge, describes as white and chunky last week. Used OTC 3 day Monistat, symptoms disappeared for 1-2 days then returned.  She states today she is having a 6.4 oz (116.8 kg). PHYSICAL EXAM:   Respiratory: no increased work of breathing  Gastrointestinal: normal bowel sounds; soft; non-distended; non-tender; obese  Neurological: awake, alert, oriented x3; CNII-XII grossly intact;   Behavioral/Psych: euthymic;

## 2021-12-21 RX ORDER — LANCETS 33 GAUGE
1 EACH MISCELLANEOUS 2 TIMES DAILY
Qty: 100 EACH | Refills: 11 | Status: SHIPPED | OUTPATIENT
Start: 2021-12-21 | End: 2022-12-21

## 2021-12-21 RX ORDER — BLOOD SUGAR DIAGNOSTIC
STRIP MISCELLANEOUS
Qty: 100 STRIP | Refills: 11 | Status: SHIPPED | OUTPATIENT
Start: 2021-12-21

## 2021-12-21 RX ORDER — BLOOD-GLUCOSE METER
1 EACH MISCELLANEOUS 2 TIMES DAILY
Qty: 1 KIT | Refills: 0 | Status: SHIPPED | OUTPATIENT
Start: 2021-12-21 | End: 2022-12-21

## 2021-12-21 NOTE — TELEPHONE ENCOUNTER
Blood Glucose Monitoring Suppl (ONETOUCH ULTRA 2) w/Device Does not apply Kit          Si Device by Other route 2 (two) times daily. Use as directed.     Disp:  1 kit    Refills:  0    Start: 2021 - 2022    Class: Normal    Non-formulary

## 2022-02-11 RX ORDER — LIRAGLUTIDE 6 MG/ML
INJECTION SUBCUTANEOUS
Refills: 0 | OUTPATIENT
Start: 2022-02-11

## 2022-02-28 ENCOUNTER — OFFICE VISIT (OUTPATIENT)
Dept: INTERNAL MEDICINE CLINIC | Facility: CLINIC | Age: 37
End: 2022-02-28
Payer: COMMERCIAL

## 2022-02-28 VITALS
DIASTOLIC BLOOD PRESSURE: 78 MMHG | HEIGHT: 68.5 IN | HEART RATE: 99 BPM | SYSTOLIC BLOOD PRESSURE: 130 MMHG | TEMPERATURE: 98 F | RESPIRATION RATE: 18 BRPM | BODY MASS INDEX: 38.03 KG/M2 | OXYGEN SATURATION: 99 % | WEIGHT: 253.81 LBS

## 2022-02-28 DIAGNOSIS — Z51.81 THERAPEUTIC DRUG MONITORING: ICD-10-CM

## 2022-02-28 DIAGNOSIS — E11.69 HYPERLIPIDEMIA ASSOCIATED WITH TYPE 2 DIABETES MELLITUS (HCC): ICD-10-CM

## 2022-02-28 DIAGNOSIS — E11.59 HYPERTENSION ASSOCIATED WITH TYPE 2 DIABETES MELLITUS (HCC): ICD-10-CM

## 2022-02-28 DIAGNOSIS — E78.5 HYPERLIPIDEMIA ASSOCIATED WITH TYPE 2 DIABETES MELLITUS (HCC): ICD-10-CM

## 2022-02-28 DIAGNOSIS — N92.0 MENORRHAGIA WITH REGULAR CYCLE: ICD-10-CM

## 2022-02-28 DIAGNOSIS — E11.9 TYPE 2 DIABETES MELLITUS WITHOUT COMPLICATION, WITHOUT LONG-TERM CURRENT USE OF INSULIN (HCC): Primary | ICD-10-CM

## 2022-02-28 DIAGNOSIS — I15.2 HYPERTENSION ASSOCIATED WITH TYPE 2 DIABETES MELLITUS (HCC): ICD-10-CM

## 2022-02-28 PROCEDURE — 3008F BODY MASS INDEX DOCD: CPT | Performed by: INTERNAL MEDICINE

## 2022-02-28 PROCEDURE — 3078F DIAST BP <80 MM HG: CPT | Performed by: INTERNAL MEDICINE

## 2022-02-28 PROCEDURE — 99214 OFFICE O/P EST MOD 30 MIN: CPT | Performed by: INTERNAL MEDICINE

## 2022-02-28 PROCEDURE — 3075F SYST BP GE 130 - 139MM HG: CPT | Performed by: INTERNAL MEDICINE

## 2022-02-28 RX ORDER — PEN NEEDLE, DIABETIC 30 GX3/16"
1 NEEDLE, DISPOSABLE MISCELLANEOUS DAILY
Qty: 90 EACH | Refills: 2 | Status: SHIPPED | OUTPATIENT
Start: 2022-02-28 | End: 2022-03-30

## 2022-02-28 RX ORDER — LIRAGLUTIDE 6 MG/ML
3 INJECTION, SOLUTION SUBCUTANEOUS DAILY
Qty: 9 ML | Refills: 3 | Status: SHIPPED | OUTPATIENT
Start: 2022-02-28 | End: 2022-03-30

## 2022-03-01 RX ORDER — LIRAGLUTIDE 6 MG/ML
INJECTION SUBCUTANEOUS
Refills: 0 | OUTPATIENT
Start: 2022-03-01

## 2022-03-01 RX ORDER — PHENTERMINE HYDROCHLORIDE 15 MG/1
15 CAPSULE ORAL EVERY MORNING
Qty: 30 CAPSULE | Refills: 0 | Status: SHIPPED | OUTPATIENT
Start: 2022-03-01

## 2022-03-11 PROCEDURE — 3044F HG A1C LEVEL LT 7.0%: CPT | Performed by: INTERNAL MEDICINE

## 2022-03-11 PROCEDURE — 3061F NEG MICROALBUMINURIA REV: CPT | Performed by: INTERNAL MEDICINE

## 2022-03-12 LAB
ALT: 10 U/L (ref 6–29)
AST: 10 U/L (ref 10–30)
BUN: 14 MG/DL (ref 7–25)
CALCIUM: 9.8 MG/DL (ref 8.6–10.2)
CARBON DIOXIDE: 26 MMOL/L (ref 20–32)
CHLORIDE: 102 MMOL/L (ref 98–110)
CHOL/HDLC RATIO: 3.8 (CALC)
CHOLESTEROL, TOTAL: 189 MG/DL
CREATININE, RANDOM URINE: 156 MG/DL (ref 20–275)
CREATININE: 0.91 MG/DL (ref 0.5–1.1)
EGFR IF AFRICN AM: 94 ML/MIN/1.73M2
EGFR IF NONAFRICN AM: 81 ML/MIN/1.73M2
GLUCOSE: 88 MG/DL (ref 65–99)
HDL CHOLESTEROL: 50 MG/DL
HEMOGLOBIN A1C: 6.2 % OF TOTAL HGB
LDL-CHOLESTEROL: 113 MG/DL (CALC)
MICROALBUMIN/CREATININE RATIO, RANDOM URINE: 12 MCG/MG CREAT
MICROALBUMIN: 1.8 MG/DL
NON-HDL CHOLESTEROL: 139 MG/DL (CALC)
POTASSIUM: 4.3 MMOL/L (ref 3.5–5.3)
SODIUM: 138 MMOL/L (ref 135–146)
TRIGLYCERIDES: 151 MG/DL

## 2022-04-18 ENCOUNTER — OFFICE VISIT (OUTPATIENT)
Dept: INTERNAL MEDICINE CLINIC | Facility: CLINIC | Age: 37
End: 2022-04-18
Payer: COMMERCIAL

## 2022-04-18 VITALS
TEMPERATURE: 98 F | SYSTOLIC BLOOD PRESSURE: 148 MMHG | HEART RATE: 92 BPM | BODY MASS INDEX: 38.62 KG/M2 | WEIGHT: 257.81 LBS | DIASTOLIC BLOOD PRESSURE: 90 MMHG | RESPIRATION RATE: 16 BRPM | HEIGHT: 68.5 IN | OXYGEN SATURATION: 99 %

## 2022-04-18 DIAGNOSIS — I15.2 HYPERTENSION ASSOCIATED WITH TYPE 2 DIABETES MELLITUS (HCC): ICD-10-CM

## 2022-04-18 DIAGNOSIS — Z51.81 THERAPEUTIC DRUG MONITORING: ICD-10-CM

## 2022-04-18 DIAGNOSIS — K21.9 GASTROESOPHAGEAL REFLUX DISEASE, UNSPECIFIED WHETHER ESOPHAGITIS PRESENT: ICD-10-CM

## 2022-04-18 DIAGNOSIS — M54.41 ACUTE RIGHT-SIDED LOW BACK PAIN WITH RIGHT-SIDED SCIATICA: Primary | ICD-10-CM

## 2022-04-18 DIAGNOSIS — E11.59 HYPERTENSION ASSOCIATED WITH TYPE 2 DIABETES MELLITUS (HCC): ICD-10-CM

## 2022-04-18 DIAGNOSIS — E11.69 DIABETES MELLITUS TYPE 2 IN OBESE (HCC): ICD-10-CM

## 2022-04-18 DIAGNOSIS — E66.9 DIABETES MELLITUS TYPE 2 IN OBESE (HCC): ICD-10-CM

## 2022-04-18 PROCEDURE — 99214 OFFICE O/P EST MOD 30 MIN: CPT | Performed by: INTERNAL MEDICINE

## 2022-04-18 PROCEDURE — 3008F BODY MASS INDEX DOCD: CPT | Performed by: INTERNAL MEDICINE

## 2022-04-18 PROCEDURE — 3077F SYST BP >= 140 MM HG: CPT | Performed by: INTERNAL MEDICINE

## 2022-04-18 PROCEDURE — 3080F DIAST BP >= 90 MM HG: CPT | Performed by: INTERNAL MEDICINE

## 2022-04-18 RX ORDER — BUPROPION HYDROCHLORIDE 150 MG/1
150 TABLET ORAL SEE ADMIN INSTRUCTIONS
Qty: 7 TABLET | Refills: 0 | Status: SHIPPED | OUTPATIENT
Start: 2022-04-18

## 2022-04-18 RX ORDER — BUPROPION HYDROCHLORIDE 300 MG/1
300 TABLET ORAL DAILY
Qty: 30 TABLET | Refills: 1 | Status: SHIPPED | OUTPATIENT
Start: 2022-04-18

## 2022-05-11 RX ORDER — BUPROPION HYDROCHLORIDE 300 MG/1
300 TABLET ORAL DAILY
Qty: 30 TABLET | Refills: 1 | Status: SHIPPED | OUTPATIENT
Start: 2022-05-11

## 2022-05-13 ENCOUNTER — TELEPHONE (OUTPATIENT)
Dept: INTERNAL MEDICINE CLINIC | Facility: CLINIC | Age: 37
End: 2022-05-13

## 2022-05-13 NOTE — TELEPHONE ENCOUNTER
Incoming diabetic eye exam via fax from [facility] Asst. Report has been reviewed and signed by KS. Flowsheet has been updated, report sent to scan.

## 2022-06-03 RX ORDER — BUPROPION HYDROCHLORIDE 300 MG/1
300 TABLET ORAL DAILY
Qty: 90 TABLET | Refills: 0 | Status: SHIPPED | OUTPATIENT
Start: 2022-06-03 | End: 2022-08-30

## 2022-07-15 RX ORDER — LIRAGLUTIDE 6 MG/ML
1.8 INJECTION SUBCUTANEOUS DAILY
Qty: 9 ML | Refills: 0 | Status: SHIPPED | OUTPATIENT
Start: 2022-07-15

## 2022-08-16 RX ORDER — LISINOPRIL AND HYDROCHLOROTHIAZIDE 12.5; 1 MG/1; MG/1
1 TABLET ORAL DAILY
Qty: 90 TABLET | Refills: 0 | Status: SHIPPED | OUTPATIENT
Start: 2022-08-16 | End: 2022-09-01

## 2022-08-22 RX ORDER — LIRAGLUTIDE 6 MG/ML
INJECTION SUBCUTANEOUS
Qty: 9 EACH | Refills: 0 | Status: SHIPPED | OUTPATIENT
Start: 2022-08-22

## 2022-08-30 RX ORDER — BUPROPION HYDROCHLORIDE 300 MG/1
300 TABLET ORAL DAILY
Qty: 90 TABLET | Refills: 0 | Status: SHIPPED | OUTPATIENT
Start: 2022-08-30 | End: 2022-10-26

## 2022-09-02 RX ORDER — HYDROCHLOROTHIAZIDE 25 MG/1
TABLET ORAL
Qty: 90 TABLET | Refills: 0 | Status: SHIPPED | OUTPATIENT
Start: 2022-09-02

## 2022-09-02 RX ORDER — LISINOPRIL 40 MG/1
TABLET ORAL
Qty: 90 TABLET | Refills: 0 | Status: SHIPPED | OUTPATIENT
Start: 2022-09-02

## 2022-10-26 ENCOUNTER — OFFICE VISIT (OUTPATIENT)
Dept: INTERNAL MEDICINE CLINIC | Facility: CLINIC | Age: 37
End: 2022-10-26
Payer: COMMERCIAL

## 2022-10-26 VITALS
TEMPERATURE: 98 F | WEIGHT: 259 LBS | HEIGHT: 68 IN | RESPIRATION RATE: 16 BRPM | HEART RATE: 96 BPM | DIASTOLIC BLOOD PRESSURE: 70 MMHG | BODY MASS INDEX: 39.25 KG/M2 | OXYGEN SATURATION: 99 % | SYSTOLIC BLOOD PRESSURE: 132 MMHG

## 2022-10-26 DIAGNOSIS — Z00.00 ROUTINE GENERAL MEDICAL EXAMINATION AT A HEALTH CARE FACILITY: Primary | ICD-10-CM

## 2022-10-26 DIAGNOSIS — R00.0 TACHYCARDIA: ICD-10-CM

## 2022-10-26 DIAGNOSIS — N63.42 SUBAREOLAR MASS OF LEFT BREAST: ICD-10-CM

## 2022-10-26 PROBLEM — D31.32 BENIGN NEOPLASM OF LEFT CHOROID: Status: ACTIVE | Noted: 2022-10-26

## 2022-10-26 PROBLEM — H35.039 HYPERTENSIVE RETINOPATHY: Status: ACTIVE | Noted: 2022-10-26

## 2022-10-26 PROBLEM — E11.9 TYPE 2 DIABETES MELLITUS WITHOUT COMPLICATION (HCC): Status: ACTIVE | Noted: 2022-10-26

## 2022-10-26 PROCEDURE — 90471 IMMUNIZATION ADMIN: CPT | Performed by: INTERNAL MEDICINE

## 2022-10-26 PROCEDURE — 3008F BODY MASS INDEX DOCD: CPT | Performed by: INTERNAL MEDICINE

## 2022-10-26 PROCEDURE — 3075F SYST BP GE 130 - 139MM HG: CPT | Performed by: INTERNAL MEDICINE

## 2022-10-26 PROCEDURE — 99395 PREV VISIT EST AGE 18-39: CPT | Performed by: INTERNAL MEDICINE

## 2022-10-26 PROCEDURE — 90686 IIV4 VACC NO PRSV 0.5 ML IM: CPT | Performed by: INTERNAL MEDICINE

## 2022-10-26 PROCEDURE — 3078F DIAST BP <80 MM HG: CPT | Performed by: INTERNAL MEDICINE

## 2022-10-26 RX ORDER — FLUTICASONE PROPIONATE 50 MCG
2 SPRAY, SUSPENSION (ML) NASAL DAILY
Qty: 48 ML | Refills: 3 | Status: SHIPPED | OUTPATIENT
Start: 2022-10-26

## 2022-10-26 RX ORDER — FAMOTIDINE 20 MG/1
20 TABLET, FILM COATED ORAL 2 TIMES DAILY
Qty: 180 TABLET | Refills: 3 | Status: SHIPPED | OUTPATIENT
Start: 2022-10-26

## 2022-10-26 RX ORDER — FAMOTIDINE 20 MG/1
20 TABLET, FILM COATED ORAL 2 TIMES DAILY
COMMUNITY
End: 2022-10-26

## 2022-11-21 ENCOUNTER — TELEPHONE (OUTPATIENT)
Dept: INTERNAL MEDICINE CLINIC | Facility: CLINIC | Age: 37
End: 2022-11-21

## 2022-11-21 DIAGNOSIS — N63.42 SUBAREOLAR MASS OF LEFT BREAST: Primary | ICD-10-CM

## 2022-11-21 NOTE — TELEPHONE ENCOUNTER
Patient called to have mammogram order changed and faxed to [facility] at  [phone]. Patient states she called to make an appointment and they told her they couldn't do a mammogram on only the left breast. They asked her to obtain an order for a bilateral mammogram and an ultrasound if needed.

## 2022-11-22 ENCOUNTER — PATIENT MESSAGE (OUTPATIENT)
Dept: INTERNAL MEDICINE CLINIC | Facility: CLINIC | Age: 37
End: 2022-11-22

## 2022-11-22 DIAGNOSIS — N63.42 SUBAREOLAR MASS OF LEFT BREAST: ICD-10-CM

## 2022-11-22 NOTE — TELEPHONE ENCOUNTER
From: Reagan Trinidad  Sent: 11/22/2022 3:30 PM CST  To: [facility] Clinical Staff  Subject: Mammogram order    Hello,    I tried calling to schedule and they are saying they have not received it.  Can you fax it to me as well to [phone]

## 2023-01-26 ENCOUNTER — PATIENT MESSAGE (OUTPATIENT)
Dept: INTERNAL MEDICINE CLINIC | Facility: CLINIC | Age: 38
End: 2023-01-26

## 2023-01-26 NOTE — TELEPHONE ENCOUNTER
From: Ekta Borges  To: Lida Lopez MD  Sent: 1/26/2023 11:30 AM CST  Subject: Lab order    Hello,    I am reaching out because I have overdue labs needed. I am unable to go to the [facility] lab because it is not in my insurance network. Can I have a written order for my labs in order to complete them. I can come  as early as Monday 1/30/23.      Jerome Cheney

## 2023-01-26 NOTE — TELEPHONE ENCOUNTER
From: Herbert Glass  To: Yuridia Simmons MD  Sent: 1/26/2023 11:30 AM CST  Subject: Lab order    Hello,    I am reaching out because I have overdue labs needed. I am unable to go to the [address] lab because it is not in my insurance network. Can I have a written order for my labs in order to complete them. I can come  as early as Monday 1/30/23.      Valencia Crews

## 2023-01-27 NOTE — TELEPHONE ENCOUNTER
Last refill 11/29/2022  Please fill if ok   Pt sees Dr Bola Anderson at the [facility] - their fax machine is currently broken - Staff was able to give number to their \"sister store\" in [address]    Referral was faxed to [phone] - fax confirmation received.

## 2023-01-30 ENCOUNTER — PATIENT MESSAGE (OUTPATIENT)
Dept: INTERNAL MEDICINE CLINIC | Facility: CLINIC | Age: 38
End: 2023-01-30

## 2023-01-30 DIAGNOSIS — E11.9 TYPE 2 DIABETES MELLITUS WITHOUT COMPLICATION, WITHOUT LONG-TERM CURRENT USE OF INSULIN (HCC): ICD-10-CM

## 2023-01-30 DIAGNOSIS — E66.9 DIABETES MELLITUS TYPE 2 IN OBESE (HCC): Primary | ICD-10-CM

## 2023-01-30 DIAGNOSIS — E11.69 DIABETES MELLITUS TYPE 2 IN OBESE (HCC): Primary | ICD-10-CM

## 2023-01-30 DIAGNOSIS — D50.9 MICROCYTIC ANEMIA: ICD-10-CM

## 2023-01-30 PROCEDURE — 3044F HG A1C LEVEL LT 7.0%: CPT | Performed by: INTERNAL MEDICINE

## 2023-01-31 ENCOUNTER — PATIENT MESSAGE (OUTPATIENT)
Dept: INTERNAL MEDICINE CLINIC | Facility: CLINIC | Age: 38
End: 2023-01-31

## 2023-01-31 RX ORDER — LISINOPRIL 40 MG/1
TABLET ORAL
Qty: 90 TABLET | Refills: 0 | Status: SHIPPED | OUTPATIENT
Start: 2023-01-31

## 2023-01-31 RX ORDER — CETIRIZINE HYDROCHLORIDE 10 MG/1
10 TABLET ORAL DAILY
Qty: 90 TABLET | Refills: 0 | Status: SHIPPED | OUTPATIENT
Start: 2023-01-31

## 2023-01-31 RX ORDER — HYDROCHLOROTHIAZIDE 25 MG/1
TABLET ORAL
Qty: 90 TABLET | Refills: 0 | Status: SHIPPED | OUTPATIENT
Start: 2023-01-31

## 2023-01-31 NOTE — TELEPHONE ENCOUNTER
Lvmtcb. Inform pt due for additional labs. See other [facility] TE pt requesting labs to be faxed. Orders have been faxed.

## 2023-02-02 LAB
ALT: 20
AST: 30
BASO (ABSOLUTE): 0.04 X10E3/UL
EOS: 4.7 %
EOSINOPHIL %: 4.7
HCT: 38.5
HEMOGLOBIN A1C: 6.9 %
HGB: 11.8 G/DL
LYMPHOCYTES, %: 38.8
MCH: 22.3 PG
MCHC: 30.6 G/DL
MCV: 72.6 FL
MONO: 0.5
PLATELET COUNT: 290
RBC: 5.3
TSH W/REFLEX TO FT4: 6.9
WBC: 7.8 X 10-3/UL

## 2023-03-15 ENCOUNTER — TELEPHONE (OUTPATIENT)
Dept: INTERNAL MEDICINE CLINIC | Facility: CLINIC | Age: 38
End: 2023-03-15

## 2023-03-15 NOTE — TELEPHONE ENCOUNTER
Incoming fax from [address] [place] [address] Laboratory of lab results. Placed in Dr Núñez Hence in basket for her to review.

## 2023-03-16 LAB — FOLATE, SERUM: 9

## 2023-03-16 PROCEDURE — 3061F NEG MICROALBUMINURIA REV: CPT | Performed by: INTERNAL MEDICINE

## 2023-04-07 ENCOUNTER — OFFICE VISIT (OUTPATIENT)
Dept: INTERNAL MEDICINE CLINIC | Facility: CLINIC | Age: 38
End: 2023-04-07
Payer: COMMERCIAL

## 2023-04-07 VITALS
DIASTOLIC BLOOD PRESSURE: 60 MMHG | BODY MASS INDEX: 39.28 KG/M2 | WEIGHT: 262.19 LBS | RESPIRATION RATE: 15 BRPM | SYSTOLIC BLOOD PRESSURE: 118 MMHG | HEART RATE: 75 BPM | HEIGHT: 68.5 IN | TEMPERATURE: 97 F

## 2023-04-07 DIAGNOSIS — K21.9 GASTROESOPHAGEAL REFLUX DISEASE, UNSPECIFIED WHETHER ESOPHAGITIS PRESENT: ICD-10-CM

## 2023-04-07 DIAGNOSIS — E66.9 DIABETES MELLITUS TYPE 2 IN OBESE (HCC): Primary | ICD-10-CM

## 2023-04-07 DIAGNOSIS — E11.69 HYPERLIPIDEMIA ASSOCIATED WITH TYPE 2 DIABETES MELLITUS (HCC): ICD-10-CM

## 2023-04-07 DIAGNOSIS — E78.5 HYPERLIPIDEMIA ASSOCIATED WITH TYPE 2 DIABETES MELLITUS (HCC): ICD-10-CM

## 2023-04-07 DIAGNOSIS — D50.9 MICROCYTIC ANEMIA: ICD-10-CM

## 2023-04-07 DIAGNOSIS — Z86.19 HISTORY OF HELICOBACTER PYLORI INFECTION: ICD-10-CM

## 2023-04-07 DIAGNOSIS — E11.69 DIABETES MELLITUS TYPE 2 IN OBESE (HCC): Primary | ICD-10-CM

## 2023-04-07 PROBLEM — D31.32 BENIGN NEOPLASM OF LEFT CHOROID: Status: RESOLVED | Noted: 2022-10-26 | Resolved: 2023-04-07

## 2023-04-07 PROBLEM — Z51.81 THERAPEUTIC DRUG MONITORING: Status: RESOLVED | Noted: 2019-08-29 | Resolved: 2023-04-07

## 2023-04-07 PROCEDURE — 3074F SYST BP LT 130 MM HG: CPT | Performed by: INTERNAL MEDICINE

## 2023-04-07 PROCEDURE — 3078F DIAST BP <80 MM HG: CPT | Performed by: INTERNAL MEDICINE

## 2023-04-07 PROCEDURE — 3008F BODY MASS INDEX DOCD: CPT | Performed by: INTERNAL MEDICINE

## 2023-04-07 PROCEDURE — 99214 OFFICE O/P EST MOD 30 MIN: CPT | Performed by: INTERNAL MEDICINE

## 2023-04-07 RX ORDER — OMEPRAZOLE 20 MG/1
20 CAPSULE, DELAYED RELEASE ORAL
Qty: 30 CAPSULE | Refills: 1 | Status: SHIPPED | OUTPATIENT
Start: 2023-04-07

## 2023-04-07 RX ORDER — LISINOPRIL 40 MG/1
40 TABLET ORAL EVERY EVENING
Qty: 90 TABLET | Refills: 3 | Status: SHIPPED | OUTPATIENT
Start: 2023-04-07

## 2023-04-07 RX ORDER — HYDROCHLOROTHIAZIDE 25 MG/1
25 TABLET ORAL DAILY
Qty: 90 TABLET | Refills: 3 | Status: SHIPPED | OUTPATIENT
Start: 2023-04-07

## 2023-04-07 RX ORDER — OMEPRAZOLE 20 MG/1
CAPSULE, DELAYED RELEASE ORAL
Qty: 90 CAPSULE | Refills: 0 | OUTPATIENT
Start: 2023-04-07

## 2023-04-07 NOTE — PATIENT INSTRUCTIONS
Please continue omeprazole 20 mg thirty minutes before meals for 4 weeks. You can continue famotidine nightly as well. If this is not improving your heartburn in 4 weeks then we will need you to see gastroenterology. Please complete the vitamin levels and stool testing NOW. Please complete the other labs in July, 2023. You are due for your diabetic eye exam in May, 2023.

## 2023-05-05 NOTE — TELEPHONE ENCOUNTER
Wilbur Traci presents today for   Chief Complaint   Patient presents with    Follow-up     6 month f/u    Discuss Labs     Completed 4/28/2023       1. \"Have you been to the ER, urgent care clinic since your last visit? Hospitalized since your last visit? \" No    2. \"Have you seen or consulted any other health care providers outside of the [address] [place] [address] since your last visit? \" No     3. For patients aged 39-70: Has the patient had a colonoscopy / FIT/ Cologuard? Yes      If the patient is female:    4. For patients aged 41-77: Has the patient had a mammogram within the past 2 years? Yes  See top three    5. For patients aged 21-65: Has the patient had a pap smear?  Yes lov 6/7/17

## 2023-06-19 LAB
ALT: 31
AMB EXT GLUCOSE: 135 MG/DL
AMB EXT HGBA1C: 7.5 %
AST: 16
CALCIUM: 9.5
FERRITIN, SERUM: 77 NG/ML
FOLATE (FOLIC ACID), SERUM: 19.9
IRON SATURATION: 23.9 %
IRON, SERUM: 86 ΜG/DL
MEAN CELL VOLUME: 73
MEAN CORPUSCULAR HEMOGLOBIN: 22.4
MEAN CORPUSCULAR HGB CONC: 30.7
RED BLOOD COUNT: 5.18
TIBC: 360
VITAMIN B12: 1501 PG/ML

## 2023-06-21 ENCOUNTER — OFFICE VISIT (OUTPATIENT)
Dept: INTERNAL MEDICINE CLINIC | Facility: CLINIC | Age: 38
End: 2023-06-21
Payer: COMMERCIAL

## 2023-06-21 ENCOUNTER — TELEPHONE (OUTPATIENT)
Dept: INTERNAL MEDICINE CLINIC | Facility: CLINIC | Age: 38
End: 2023-06-21

## 2023-06-21 VITALS
HEIGHT: 68.5 IN | OXYGEN SATURATION: 98 % | HEART RATE: 98 BPM | WEIGHT: 261 LBS | SYSTOLIC BLOOD PRESSURE: 120 MMHG | TEMPERATURE: 97 F | BODY MASS INDEX: 39.1 KG/M2 | DIASTOLIC BLOOD PRESSURE: 78 MMHG | RESPIRATION RATE: 16 BRPM

## 2023-06-21 DIAGNOSIS — I15.2 HYPERTENSION ASSOCIATED WITH TYPE 2 DIABETES MELLITUS (HCC): ICD-10-CM

## 2023-06-21 DIAGNOSIS — E11.69 HYPERLIPIDEMIA ASSOCIATED WITH TYPE 2 DIABETES MELLITUS (HCC): ICD-10-CM

## 2023-06-21 DIAGNOSIS — E11.9 TYPE 2 DIABETES MELLITUS WITHOUT COMPLICATION, WITHOUT LONG-TERM CURRENT USE OF INSULIN (HCC): Primary | ICD-10-CM

## 2023-06-21 DIAGNOSIS — M79.602 LEFT ARM PAIN: ICD-10-CM

## 2023-06-21 DIAGNOSIS — E78.5 HYPERLIPIDEMIA ASSOCIATED WITH TYPE 2 DIABETES MELLITUS (HCC): ICD-10-CM

## 2023-06-21 DIAGNOSIS — D50.9 MICROCYTIC ANEMIA: ICD-10-CM

## 2023-06-21 DIAGNOSIS — E11.59 HYPERTENSION ASSOCIATED WITH TYPE 2 DIABETES MELLITUS (HCC): ICD-10-CM

## 2023-06-21 DIAGNOSIS — L73.2 HIDRADENITIS SUPPURATIVA: ICD-10-CM

## 2023-06-21 PROBLEM — E66.01 MORBID (SEVERE) OBESITY DUE TO EXCESS CALORIES (HCC): Status: ACTIVE | Noted: 2023-06-21

## 2023-06-21 PROCEDURE — 3008F BODY MASS INDEX DOCD: CPT | Performed by: INTERNAL MEDICINE

## 2023-06-21 PROCEDURE — 3078F DIAST BP <80 MM HG: CPT | Performed by: INTERNAL MEDICINE

## 2023-06-21 PROCEDURE — 3074F SYST BP LT 130 MM HG: CPT | Performed by: INTERNAL MEDICINE

## 2023-06-21 PROCEDURE — 99214 OFFICE O/P EST MOD 30 MIN: CPT | Performed by: INTERNAL MEDICINE

## 2023-06-21 RX ORDER — PEN NEEDLE, DIABETIC 30 GX3/16"
1 NEEDLE, DISPOSABLE MISCELLANEOUS DAILY
Qty: 90 EACH | Refills: 2 | Status: SHIPPED | OUTPATIENT
Start: 2023-06-21 | End: 2023-07-21

## 2023-06-21 RX ORDER — LISINOPRIL AND HYDROCHLOROTHIAZIDE 25; 20 MG/1; MG/1
1 TABLET ORAL DAILY
Qty: 90 TABLET | Refills: 1 | Status: SHIPPED | OUTPATIENT
Start: 2023-06-21

## 2023-06-21 NOTE — TELEPHONE ENCOUNTER
Lab results from [facility] placed in Dr. Juan Larsen in-box. Lab results updated in Epic.

## 2023-06-21 NOTE — PATIENT INSTRUCTIONS
You can also use the following topical agents for your pain. They are over the counter:  1. Topical lidocaine (lidoderm patches). Wear for 12 hours and off for 12 hours  2. Topical diclofenac gel can be used several times/day    Please complete your diabetic eye exam as soon as possible.

## 2023-06-23 NOTE — TELEPHONE ENCOUNTER
From: Ailyn Leung  To: Yu Lloyd MD  Sent: 9/10/2019 9:33 PM CDT  Subject: Prescription Question    I am waiting on approval for Saxenda that me & Dr. Naina Kelly discussed at my last appt about 2 weeks ago.  I called my insurance company and they have not rec
No

## 2023-07-10 ENCOUNTER — PATIENT MESSAGE (OUTPATIENT)
Dept: INTERNAL MEDICINE CLINIC | Facility: CLINIC | Age: 38
End: 2023-07-10

## 2023-07-11 RX ORDER — TIRZEPATIDE 2.5 MG/.5ML
2.5 INJECTION, SOLUTION SUBCUTANEOUS WEEKLY
Qty: 3 ML | Refills: 0 | Status: SHIPPED | OUTPATIENT
Start: 2023-07-11

## 2023-07-11 RX ORDER — TIRZEPATIDE 5 MG/.5ML
5 INJECTION, SOLUTION SUBCUTANEOUS WEEKLY
Qty: 3 ML | Refills: 0 | Status: SHIPPED | OUTPATIENT
Start: 2023-07-11

## 2023-07-11 NOTE — TELEPHONE ENCOUNTER
From: Ilene Hull  To: Tony Nunez MD  Sent: 7/10/2023 10:26 AM CDT  Subject: Medication change    Hi Dr. Stiven Hernandez,    I was wondering if we could try mounjaro since it only needs to be injected weekly and I've heard it works better to help lose weight. It is now covered by my insurance.      Enrique Valentine

## 2023-07-21 ENCOUNTER — OFFICE VISIT (OUTPATIENT)
Dept: OBGYN CLINIC | Facility: CLINIC | Age: 38
End: 2023-07-21
Payer: COMMERCIAL

## 2023-07-21 VITALS
DIASTOLIC BLOOD PRESSURE: 84 MMHG | HEART RATE: 94 BPM | BODY MASS INDEX: 40 KG/M2 | SYSTOLIC BLOOD PRESSURE: 131 MMHG | WEIGHT: 266.06 LBS

## 2023-07-21 DIAGNOSIS — Z12.4 CERVICAL CANCER SCREENING: Primary | ICD-10-CM

## 2023-07-21 DIAGNOSIS — Z01.419 ENCOUNTER FOR WELL WOMAN EXAM WITH ROUTINE GYNECOLOGICAL EXAM: ICD-10-CM

## 2023-07-21 PROCEDURE — 3075F SYST BP GE 130 - 139MM HG: CPT

## 2023-07-21 PROCEDURE — 87624 HPV HI-RISK TYP POOLED RSLT: CPT

## 2023-07-21 PROCEDURE — 99395 PREV VISIT EST AGE 18-39: CPT

## 2023-07-21 PROCEDURE — 3079F DIAST BP 80-89 MM HG: CPT

## 2023-07-21 NOTE — PROGRESS NOTES
Mayra Paulino is a 45year old female  Patient's last menstrual period was 2023 (approximate). Patient presents with:  Physical: Well woman exam   .    OBSTETRICS HISTORY:  OB History    Para Term  AB Living   1       1     SAB IAB Ectopic Multiple Live Births     1            # Outcome Date GA Lbr Denzel/2nd Weight Sex Delivery Anes PTL Lv   1 IAB 2011 6w0d              GYNE HISTORY:  Periods spotting only , but in July she had a moderate bleeding for 5-7 days. Sexual activity:   Yes      Partners:   Male      Birth control/ protection:   I.U.D. Comment:   None                    MEDICAL HISTORY:  Past Medical History:   Diagnosis Date    Abnormal uterine bleeding     Allergic rhinitis     Anemia     Diabetes mellitus ([facility] [address]     Dysmenorrhea     Esophageal reflux     Essential hypertension 2015    Hypertension     Obesity     Type II or unspecified type diabetes mellitus without mention of complication, not stated as uncontrolled        SURGICAL HISTORY:  Past Surgical History:   Procedure Laterality Date    Colonoscopy      Upper gi endoscopy performed  2016    gastritis, duodenal ulcer, HP negative       SOCIAL HISTORY:  Social History    Socioeconomic History      Marital status: Single      Spouse name: Not on file      Number of children: Not on file      Years of education: Not on file      Highest education level: Not on file    Occupational History      Not on file    Tobacco Use      Smoking status: Never      Smokeless tobacco: Never    Vaping Use      Vaping Use: Never used    Substance and Sexual Activity      Alcohol use: Yes        Alcohol/week: 2.0 standard drinks of alcohol        Types: 1 Glasses of wine, 1 Standard drinks or equivalent per week        Comment: 2 x/ month      Drug use: No      Sexual activity: Yes        Partners: Male        Birth control/protection: I.U.D. Comment: None    Other Topics      Concerns:        Caffeine Concern:  No Exercise: No        Seat Belt: Yes        Special Diet: No        Stress Concern: No        Weight Concern: Yes    Social History Narrative      Not on file    Social Determinants of Health  Financial Resource Strain: Not on file  Food Insecurity: Not on file  Transportation Needs: Not on file  Physical Activity: Not on file  Stress: Not on file  Social Connections: Not on file  Housing Stability: Not on file    FAMILY HISTORY:  Family History   Problem Relation Age of Onset    Anemia Mother     Diabetes Mother     Hypertension Mother     Cancer Maternal Grandfather     Cancer Other     Heart Disease Other     Stroke Neg        MEDICATIONS:    Current Outpatient Medications:     Tirzepatide (MOUNJARO) 2.5 MG/0.5ML Subcutaneous Solution Pen-injector, Inject 2.5 mg into the skin once a week. Initial: 2.5 mg once weekly for 4 weeks, then increase to 5 mg once weekly. , Disp: 3 mL, Rfl: 0    Tirzepatide (MOUNJARO) 5 MG/0.5ML Subcutaneous Solution Pen-injector, Inject 5 mg into the skin once a week. Initial: 2.5 mg once weekly for 4 weeks, then increase to 5 mg once weekly. , Disp: 3 mL, Rfl: 0    mupirocin 2 % External Ointment, Apply 1 Application topically 2 (two) times daily as needed (to appropriate areas). , Disp: 15 g, Rfl: 0    lisinopril-hydroCHLOROthiazide 20-25 MG Oral Tab, Take 1 tablet by mouth daily. , Disp: 90 tablet, Rfl: 1    cetirizine 10 MG Oral Tab, Take 1 tablet (10 mg total) by mouth daily. , Disp: 90 tablet, Rfl: 0    metFORMIN HCl 1000 MG Oral Tab, Take 1 tablet (1,000 mg total) by mouth 2 (two) times daily with meals. , Disp: 180 tablet, Rfl: 3    famotidine 20 MG Oral Tab, Take 1 tablet (20 mg total) by mouth in the morning and 1 tablet (20 mg total) before bedtime. , Disp: 180 tablet, Rfl: 3    fluticasone propionate 50 MCG/ACT Nasal Suspension, 2 sprays by Nasal route in the morning., Disp: 48 mL, Rfl: 3    Insulin Pen Needle (PEN NEEDLES) 32G X 4 MM Does not apply Misc, 1 each daily.  For daily victoza injections, Disp: 90 each, Rfl: 2    ALLERGIES:    Pcn [Penicillins]       ANAPHYLAXIS  Pylera                      Comment:Nerve pain  Sulfa Antibiotics       ANAPHYLAXIS      Review of Systems:  Constitutional:  Denies fatigue, night sweats, hot flashes  Eyes:  denies blurred or double vision  Cardiovascular:  denies chest pain or palpitations  Respiratory:  denies shortness of breath  Gastrointestinal:  denies heartburn, abdominal pain, diarrhea or constipation  Genitourinary:  denies dysuria, incontinence, abnormal vaginal discharge, vaginal itching  Musculoskeletal:  denies back pain. Skin/Breast:  Denies any breast pain, lumps, or discharge. Neurological:  denies headaches, extremity weakness or numbness. Psychiatric: denies depression or anxiety. Endocrine:   denies excessive thirst or urination. Heme/Lymph:  denies history of anemia, easy bruising or bleeding. PHYSICAL EXAM:   Constitutional: well developed, well nourished  Head/Face: normocephalic  Neck/Thyroid: thyroid symmetric, no thyromegaly, no nodules, no adenopathy  Lymphatic:no abnormal supraclavicular or axillary adenopathy is noted  Breast: normal without palpable masses, tenderness, asymmetry, nipple discharge, nipple retraction or skin changes  Abdomen:  soft, nontender, nondistended, no masses  Skin/Hair: no unusual rashes or bruises  Extremities: no edema, no cyanosis  Psychiatric:  Oriented to time, place, person and situation.  Appropriate mood and affect    Pelvic Exam:  External Genitalia: normal appearance, hair distribution, and no lesions  Urethral Meatus:  normal in size, location, without lesions and prolapse  Bladder:  No fullness, masses or tenderness  Vagina:  Normal appearance without lesions, no abnormal discharge  Cervix:  Normal without tenderness on motion, IUD strings visualized   Uterus: normal in size, contour, position, mobility, without tenderness  Adnexa: normal without masses or tenderness  Perineum: normal  Anus: no hemorroids     Assessment & Plan:  Diagnoses and all orders for this visit:    Cervical cancer screening  -     HPV HIGH RISK , THIN PREP COLLECTION;  Future  -     THINPREP PAP SMEAR B; Future    Encounter for well woman exam with routine gynecological exam

## 2023-07-24 LAB — HPV I/H RISK 1 DNA SPEC QL NAA+PROBE: NEGATIVE

## 2023-08-03 RX ORDER — TIRZEPATIDE 5 MG/.5ML
5 INJECTION, SOLUTION SUBCUTANEOUS WEEKLY
Qty: 2 ML | Refills: 0 | Status: SHIPPED | OUTPATIENT
Start: 2023-08-03

## 2023-08-03 NOTE — TELEPHONE ENCOUNTER
No protocol     Last refill:  Tirzepatide [facility]) 5 MG/0.5ML Subcutaneous Solution Pen-injector 3 mL 0 7/11/2023    Sig:   Inject 5 mg into the skin once a week. Initial: 2.5 mg once weekly for 4 weeks, then increase to 5 mg once weekly.          LOV:   06/21/2023 RTC 4 months  # Morbid Obesity, BMI 39 in DM: she has resmed victoza and plans to establish with weight loss clinic.   - did not tolerate contrave 2/2 GI upset, topamax caused foggyness, trulicity caused injection site reaction, phentermine kemi BP, wellbutrin (not effective)  No FOV scheduled

## 2023-09-06 ENCOUNTER — PATIENT MESSAGE (OUTPATIENT)
Dept: INTERNAL MEDICINE CLINIC | Facility: CLINIC | Age: 38
End: 2023-09-06

## 2023-09-07 RX ORDER — TIRZEPATIDE 7.5 MG/.5ML
7.5 INJECTION, SOLUTION SUBCUTANEOUS WEEKLY
Qty: 2 ML | Refills: 0 | Status: SHIPPED | OUTPATIENT
Start: 2023-09-07

## 2023-09-07 NOTE — TELEPHONE ENCOUNTER
From: Ilene Hull  To: Tony Nunez MD  Sent: 9/6/2023 8:44 PM CDT  Subject: Dagoberto Hernandez,    I hope all is well. I was writing to see if we could move the mounjaro up to 10mg. I have tolerated 5mg for the last month and noticed a decrease within my appetite. Please let me know what you think.      Enrique Valentine

## 2023-09-07 NOTE — TELEPHONE ENCOUNTER
See [facility] from patient. Patient requesting 10 mg but next dose is 7.5 mg. Last refill:  Medication Quantity Refills Start End   [facility] 5 MG/0.5ML Subcutaneous Solution Pen-injector 2 mL 0 8/3/2023    Sig:   Inject 5 mg into the skin once a week. Route:   Subcutaneous     Order #:   [id number]         LOV:  06/21/2023 Dr Kristi Keller RTC 10/23   # Type 2 Diabetes: A1C at goal in 1/2023. Continue to reinforce nutritional measures    - continue metformin and liraglutide.    - DM eye exam done 6/22/2023 by Yoandy Leung w/o diabetic retinopathy in either eye.   - Foot Exam: done 2023 cont nightly foot exams  - LDL: see above  - BP: see above  - micro alb:creat <30 in 2023  - Depression Screen: done 2023   - not on ASA  No FOV scheduled

## 2023-10-09 ENCOUNTER — OFFICE VISIT (OUTPATIENT)
Dept: OBGYN CLINIC | Facility: CLINIC | Age: 38
End: 2023-10-09
Payer: COMMERCIAL

## 2023-10-09 ENCOUNTER — TELEPHONE (OUTPATIENT)
Dept: OBGYN CLINIC | Facility: CLINIC | Age: 38
End: 2023-10-09

## 2023-10-09 VITALS
HEART RATE: 88 BPM | HEIGHT: 68.52 IN | BODY MASS INDEX: 37.98 KG/M2 | WEIGHT: 253.5 LBS | SYSTOLIC BLOOD PRESSURE: 137 MMHG | DIASTOLIC BLOOD PRESSURE: 89 MMHG

## 2023-10-09 DIAGNOSIS — N92.1 MENORRHAGIA WITH IRREGULAR CYCLE: Primary | ICD-10-CM

## 2023-10-09 DIAGNOSIS — N92.1 MENORRHAGIA WITH IRREGULAR CYCLE: ICD-10-CM

## 2023-10-09 DIAGNOSIS — Z23 NEED FOR VACCINATION: ICD-10-CM

## 2023-10-09 NOTE — PROGRESS NOTES
Herbert Glass is a 45year old female  Patient's last menstrual period was 10/03/2023 (exact date). Patient presents with:  Gyn Problem  Vaginal Bleeding: Bleeding heavily on IUD   IUD: Ian Cardoso, inserted on 21  Other: Patient said YES to student in the room   . OBSTETRICS HISTORY:  OB History    Para Term  AB Living   1       1     SAB IAB Ectopic Multiple Live Births     1            # Outcome Date GA Lbr Denzel/2nd Weight Sex Delivery Anes PTL Lv   1 IAB 2011 6w0d              GYNE HISTORY:  Periods irregular heavy    Sexual activity:   Yes      Partners:   Male      Birth control/ protection:   I.U.D. Comment:   None                    MEDICAL HISTORY:  Past Medical History:   Diagnosis Date    Abnormal uterine bleeding     Allergic rhinitis     Anemia     Diabetes mellitus ([facility] [place] [address]     Dysmenorrhea     Esophageal reflux     Essential hypertension 2015    Hypertension     Obesity     Type II or unspecified type diabetes mellitus without mention of complication, not stated as uncontrolled        SURGICAL HISTORY:  Past Surgical History:   Procedure Laterality Date    Colonoscopy  2016    Upper gi endoscopy performed  2016    gastritis, duodenal ulcer, HP negative       SOCIAL HISTORY:  Social History    Socioeconomic History      Marital status: Single      Spouse name: Not on file      Number of children: Not on file      Years of education: Not on file      Highest education level: Not on file    Occupational History      Not on file    Tobacco Use      Smoking status: Never      Smokeless tobacco: Never    Vaping Use      Vaping Use: Never used    Substance and Sexual Activity      Alcohol use: Yes        Alcohol/week: 2.0 standard drinks of alcohol        Types: 1 Glasses of wine, 1 Standard drinks or equivalent per week        Comment: 2 x/ month      Drug use: No      Sexual activity: Yes        Partners: Male        Birth control/protection: I.U.D.         Comment: None Other Topics      Concerns:        Caffeine Concern: No        Exercise: No        Seat Belt: Yes        Special Diet: No        Stress Concern: No        Weight Concern: Yes    Social History Narrative      Not on file    Social Determinants of Health  Financial Resource Strain: Not on file  Food Insecurity: Not on file  Transportation Needs: Not on file  Physical Activity: Not on file  Stress: Not on file  Social Connections: Not on file  Housing Stability: Not on file    FAMILY HISTORY:  Family History   Problem Relation Age of Onset    Anemia Mother     Diabetes Mother     Hypertension Mother     Cancer Maternal Grandfather     Cancer Other     Heart Disease Other     Stroke Neg        MEDICATIONS:    Current Outpatient Medications:     norethindrone 5 MG Oral Tab, Take 0.5 tablets (2.5 mg total) by mouth at bedtime. , Disp: 15 tablet, Rfl: 0    Tirzepatide (MOUNJARO) 7.5 MG/0.5ML Subcutaneous Solution Pen-injector, Inject 7.5 mg into the skin once a week., Disp: 2 mL, Rfl: 0    Tirzepatide (MOUNJARO) 5 MG/0.5ML Subcutaneous Solution Pen-injector, Inject 5 mg into the skin once a week., Disp: 2 mL, Rfl: 0    Tirzepatide (MOUNJARO) 2.5 MG/0.5ML Subcutaneous Solution Pen-injector, Inject 2.5 mg into the skin once a week. Initial: 2.5 mg once weekly for 4 weeks, then increase to 5 mg once weekly. , Disp: 3 mL, Rfl: 0    mupirocin 2 % External Ointment, Apply 1 Application topically 2 (two) times daily as needed (to appropriate areas). , Disp: 15 g, Rfl: 0    lisinopril-hydroCHLOROthiazide 20-25 MG Oral Tab, Take 1 tablet by mouth daily. , Disp: 90 tablet, Rfl: 1    cetirizine 10 MG Oral Tab, Take 1 tablet (10 mg total) by mouth daily. , Disp: 90 tablet, Rfl: 0    metFORMIN HCl 1000 MG Oral Tab, Take 1 tablet (1,000 mg total) by mouth 2 (two) times daily with meals. , Disp: 180 tablet, Rfl: 3    famotidine 20 MG Oral Tab, Take 1 tablet (20 mg total) by mouth in the morning and 1 tablet (20 mg total) before bedtime. , Disp: 180 tablet, Rfl: 3    fluticasone propionate 50 MCG/ACT Nasal Suspension, 2 sprays by Nasal route in the morning., Disp: 48 mL, Rfl: 3    ALLERGIES:    Bis Subcit-Metronid*    OTHER (SEE COMMENTS)    Comment:Nerve pain  Pcn [Penicillins]       ANAPHYLAXIS  Pylera                      Comment:Nerve pain  Sulfa Antibiotics       ANAPHYLAXIS      PHYSICAL EXAM:   Pelvic Exam:  External Genitalia: normal appearance, hair distribution, and no lesions  Urethral Meatus:  normal in size, location, without lesions and prolapse  Bladder:  No fullness, masses or tenderness  Vagina:  Normal appearance without lesions, no abnormal discharge  Cervix:  Normal without tenderness on motion, IUD strings not seen, but able to feel the strings   Uterus: normal in size, contour, position, mobility, without tenderness  Adnexa: normal without masses or tenderness  Perineum: normal  Anus: no hemorroids     Assessment & Plan:    1. Need for vaccination    - INFLUENZA VACCINE, QUAD, PRESERVATIVE FREE, 0.5 ML    2. Menorrhagia with irregular cycle    - norethindrone 5 MG Oral Tab; Take 0.5 tablets (2.5 mg total) by mouth at bedtime. Dispense: 15 tablet;  Refill: 0

## 2023-10-24 ENCOUNTER — PATIENT MESSAGE (OUTPATIENT)
Dept: INTERNAL MEDICINE CLINIC | Facility: CLINIC | Age: 38
End: 2023-10-24

## 2023-10-24 RX ORDER — OMEPRAZOLE 20 MG/1
20 CAPSULE, DELAYED RELEASE ORAL
Qty: 30 CAPSULE | Refills: 1 | OUTPATIENT
Start: 2023-10-24

## 2023-10-24 NOTE — TELEPHONE ENCOUNTER
LOV: 6/21/2023 with Dr. John Davila  RTC: October 2023  Last Relevant Labs: 6/19/2023  Filled: 4/7/2023   #30 with 1 refill    HPI from office visit 6/21/2023:  2. Stomach pain - has not completed stool testing. She completed 1 month of omeprazole and pain resolved. Now well controlled on famotidine. Is patient taking Omeprazole 20 mg? [facility] sent to patient. No future appointments.

## 2023-10-24 NOTE — TELEPHONE ENCOUNTER
From: Reagan Trinidad  To: Alcon Reagan  Sent: 10/24/2023 3:46 PM CDT  Subject: Omeprazole     Hi Dr Tucker Castillo,    I had requested a refill for omeprazole and it was denied before I was able to reply to the Nurse who questioned it. I have been experiencing a flair up of the acid reflux the last 2 days and it is cheaper to get a 30 day supply from the Pharmacy so wanted to know if it was possible to refill it.

## 2023-10-25 RX ORDER — OMEPRAZOLE 20 MG/1
20 CAPSULE, DELAYED RELEASE ORAL
Qty: 90 CAPSULE | Refills: 0 | Status: SHIPPED | OUTPATIENT
Start: 2023-10-25

## 2023-12-28 RX ORDER — LISINOPRIL AND HYDROCHLOROTHIAZIDE 25; 20 MG/1; MG/1
1 TABLET ORAL DAILY
Qty: 90 TABLET | Refills: 0 | Status: SHIPPED | OUTPATIENT
Start: 2023-12-28

## 2024-02-03 ENCOUNTER — TELEPHONE (OUTPATIENT)
Dept: INTERNAL MEDICINE CLINIC | Facility: CLINIC | Age: 39
End: 2024-02-03

## 2024-02-03 NOTE — TELEPHONE ENCOUNTER
Received a medical records request from [facility]     Fx: [phone]  Ph: [phone]    [address] [place] [address], [place] [address]    Faxed to medical records dept and sent to [facility]

## 2024-03-01 ENCOUNTER — OFFICE VISIT (OUTPATIENT)
Dept: INTERNAL MEDICINE CLINIC | Facility: CLINIC | Age: 39
End: 2024-03-01
Payer: COMMERCIAL

## 2024-03-01 VITALS
OXYGEN SATURATION: 98 % | BODY MASS INDEX: 34.74 KG/M2 | HEIGHT: 68.11 IN | SYSTOLIC BLOOD PRESSURE: 108 MMHG | WEIGHT: 229.19 LBS | TEMPERATURE: 98 F | DIASTOLIC BLOOD PRESSURE: 60 MMHG | HEART RATE: 71 BPM

## 2024-03-01 DIAGNOSIS — N93.9 ABNORMAL UTERINE BLEEDING: ICD-10-CM

## 2024-03-01 DIAGNOSIS — E11.69 HYPERLIPIDEMIA ASSOCIATED WITH TYPE 2 DIABETES MELLITUS (HCC): ICD-10-CM

## 2024-03-01 DIAGNOSIS — E78.5 HYPERLIPIDEMIA ASSOCIATED WITH TYPE 2 DIABETES MELLITUS (HCC): ICD-10-CM

## 2024-03-01 DIAGNOSIS — Z00.00 ROUTINE GENERAL MEDICAL EXAMINATION AT A HEALTH CARE FACILITY: Primary | ICD-10-CM

## 2024-03-01 DIAGNOSIS — E11.69 DIABETES MELLITUS TYPE 2 IN OBESE: ICD-10-CM

## 2024-03-01 DIAGNOSIS — E66.9 DIABETES MELLITUS TYPE 2 IN OBESE: ICD-10-CM

## 2024-03-01 DIAGNOSIS — E11.59 HYPERTENSION ASSOCIATED WITH TYPE 2 DIABETES MELLITUS (HCC): ICD-10-CM

## 2024-03-01 DIAGNOSIS — I15.2 HYPERTENSION ASSOCIATED WITH TYPE 2 DIABETES MELLITUS (HCC): ICD-10-CM

## 2024-03-01 PROBLEM — E66.01 MORBID (SEVERE) OBESITY DUE TO EXCESS CALORIES (HCC): Status: RESOLVED | Noted: 2023-06-21 | Resolved: 2024-03-01

## 2024-03-01 PROBLEM — D50.9 MICROCYTIC ANEMIA: Status: RESOLVED | Noted: 2023-04-07 | Resolved: 2024-03-01

## 2024-03-01 PROBLEM — E11.9 TYPE 2 DIABETES MELLITUS WITHOUT COMPLICATION (HCC): Status: RESOLVED | Noted: 2022-10-26 | Resolved: 2024-03-01

## 2024-03-01 LAB
CARTRIDGE LOT#: 655 NUMERIC
HEMOGLOBIN A1C: 6.3 % (ref 4.3–5.6)

## 2024-03-01 RX ORDER — FAMOTIDINE 20 MG/1
20 TABLET, FILM COATED ORAL 2 TIMES DAILY
Qty: 180 TABLET | Refills: 3 | Status: SHIPPED | OUTPATIENT
Start: 2024-03-01

## 2024-03-01 RX ORDER — LISINOPRIL 20 MG/1
20 TABLET ORAL EVERY EVENING
Qty: 90 TABLET | Refills: 0 | Status: SHIPPED | OUTPATIENT
Start: 2024-03-01 | End: 2025-02-24

## 2024-03-01 RX ORDER — TIRZEPATIDE 10 MG/.5ML
10 INJECTION, SOLUTION SUBCUTANEOUS WEEKLY
Qty: 2 ML | Refills: 1 | Status: SHIPPED | OUTPATIENT
Start: 2024-03-01

## 2024-03-01 NOTE — PATIENT INSTRUCTIONS
Please stop your lisinopril-hydrochlorothiazide and take lisinopril 20 mg only. Please update me with your home blood pressures in 14 days.    Please update me in 4 weeks with your weight so we can decide whether we should increase the mounjaro.    Please complete urine and blood work as soon as possible.     You are due for your diabetic eye exam in June, 2024.     I recommend the following vaccines -    Stay up to date with COVID vaccines.

## 2024-03-01 NOTE — PROGRESS NOTES
Elle Gutierrez is a 38 year old female.     HPI:   Patient presents for the following issues. And physical exam.   Obese - has lost 24 lbs since 10/2023! She is up to 10 mg on mounjaro for 2 months. She is tolerating it well. She is eating healthy. She is still not active.   Abnormal uterine bleeding - despite having IUD in place. They are very painful. This has been going on for 6 months. IUD was placed 3 years ago.   DM - A1C has improved to 6.3 from June, 2023.   HTN - home pressures are SBP less than 120s.     REVIEW OF SYSTEMS:   GENERAL HEALTH: feels well otherwise. No f/c  NEURO: denies any headaches, LH, dizzyness, LOC, falls  VISION: denies any blurred or double vision  RESPIRATORY: denies shortness of breath, cough, or congestion  CARDIOVASCULAR: denies chest pain, pressure or palpitations  GI: denies abdominal pain, constipation, diarrhea, n/v, BRBPR, melena  : no dysuria or hematuria  SKIN: denies any unusual skin lesions or rashes  PSYCH: mood is stable. Denies depression or anxiety.   EXT: denies edema      Wt Readings from Last 6 Encounters:   03/01/24 229 lb 3.2 oz (104 kg)   10/09/23 253 lb 8.5 oz (115 kg)   07/21/23 266 lb 0.8 oz (120.7 kg)   06/21/23 261 lb (118.4 kg)   04/07/23 262 lb 3.2 oz (118.9 kg)   10/26/22 259 lb (117.5 kg)       Allergies   Allergen Reactions    Bis Subcit-Metronid-Tetracyc OTHER (SEE COMMENTS)     Nerve pain    Pcn [Penicillins] ANAPHYLAXIS    Pylera      Nerve pain      Sulfa Antibiotics ANAPHYLAXIS       Family History   Problem Relation Age of Onset    Anemia Mother     Diabetes Mother     Hypertension Mother     Cancer Maternal Grandfather     Cancer Other     Heart Disease Other     Stroke Neg       Past Medical History:   Diagnosis Date    Abnormal uterine bleeding     Allergic rhinitis 2010    Anemia     Diabetes mellitus (HCC)     Dysmenorrhea     Esophageal reflux 2013    Essential hypertension 2015    Hypertension     Obesity     Type II or unspecified type  diabetes mellitus without mention of complication, not stated as uncontrolled       Past Surgical History:   Procedure Laterality Date    COLONOSCOPY  2016    UPPER GI ENDOSCOPY PERFORMED  1/2016    gastritis, duodenal ulcer, HP negative      Social History:    Social History     Socioeconomic History    Marital status: Single   Tobacco Use    Smoking status: Never    Smokeless tobacco: Never   Vaping Use    Vaping Use: Never used   Substance and Sexual Activity    Alcohol use: Yes     Alcohol/week: 2.0 standard drinks of alcohol     Types: 1 Glasses of wine, 1 Standard drinks or equivalent per week     Comment: 2 x/ month    Drug use: No    Sexual activity: Yes     Partners: Male     Birth control/protection: I.U.D.     Comment: None   Other Topics Concern    Caffeine Concern No    Exercise No    Seat Belt Yes    Special Diet No    Stress Concern No    Weight Concern Yes             EXAM:   /60 (BP Location: Left arm)   Pulse 71   Temp 97.8 °F (36.6 °C) (Temporal)   Ht 5' 8.11\" (1.73 m)   Wt 229 lb 3.2 oz (104 kg)   LMP 02/24/2024 (Exact Date)   SpO2 98%   BMI 34.74 kg/m²   GENERAL: A&O well developed, well nourished,in no apparent distress  SKIN: no rashes,no suspicious lesions  HEENT: atraumatic, MMM, throat is clear  NECK: supple, no jvd, no thyromegaly  LUNGS: clear to auscultation bilateraly, no c/w/r  CARDIO: RRR without g/m/r  GI: soft non tender nondistended no hsm bs throughout  NEURO: CN 2-12 grossly intact  PSYCH: pleasant  EXTREMITIES: no cyanosis, clubbing or edema  Bilateral barefoot skin diabetic exam is normal, visualized feet and the appearance is normal.  Bilateral monofilament/sensation of both feet is normal.  Pulsation pedal pulse exam of both lower legs/feet is normal as well.    ASSESSMENT AND PLAN:   # Abnormal Uterine Bleeding - despite IUD in place x 3 years. Check pelvic US, TSH, and CBC  # GERD and history of H pylori: well controlled on famotidine and dietary measures.   #  History of Duodenal Ulcer (EGD on 1/2016) and H Pylori: see above.   # Obese, BMI 34 in DM: has lost 37 lbs on mounjaro! Cont 10 mg for another 4 weeks as she is still losing 1 lb per week. Can increase if she plateuas.   - starting weight 266 lbs in 7/29023  - did not tolerate contrave 2/2 GI upset, topamax caused foggyness, trulicity caused injection site reaction, phentermine kemi BP, wellbutrin (not effective)  # HTN assoc with DM: tightly controlled so discontinue hydrochlorothiazide and cont lisinopril 20 mg only. She will update me with home pressures.   # HLP assoc with DM: no indication for statin now, cont weight loss. But if LDL remains > 70 after age 40, we will need to start statin   # Type 2 Diabetes: at goal in 3/2024. Consider discontinuing metformin in future as it never helped her lose weight.   - DM eye exam done 6/22/2023 by Clarence Bernstein w/o diabetic retinopathy in either eye.   - Foot Exam: done 2024, cont nightly foot exams  - LDL: see above  - BP: see above  - micro alb:creat <30 in 2023, re-ordered   - Depression Screen: done 2024   - not on ASA  # Migraines: cont abortive tx with nsaids  # Allergic rhinitis: not as well controlled with zyrtec and flonase. Add saline rinses.   # Health Maintenance: CPX on 3/1/2024  Colon Cancer Screening: underwent colonoscopy for GERD many years ago. Not indicated again until age 45   Cervical Cancer Screening: neg cytology, HPV in 10/2023. Repeat in 5 years.   Breast Cancer Screening: annual breast exam.  Bone Health: educated on dietary calcium/vit D, weight bearing exercises.   Vaccines: TDAP 2015, pneumovac 23 in 2020. gets annual flu shot.   Hepatitis C Screen: Ab negative       The patient indicates understanding of these issues and agrees to the plan.  The patient is asked to return in 6 months for DM and weight management.   Radha Lockwood MD

## 2024-03-21 ENCOUNTER — HOSPITAL ENCOUNTER (OUTPATIENT)
Dept: ULTRASOUND IMAGING | Age: 39
Discharge: HOME | End: 2024-03-21
Attending: INTERNAL MEDICINE
Payer: COMMERCIAL

## 2024-03-21 DIAGNOSIS — N93.9 ABNORMAL UTERINE BLEEDING: ICD-10-CM

## 2024-03-21 PROCEDURE — 76856 US EXAM PELVIC COMPLETE: CPT | Performed by: INTERNAL MEDICINE

## 2024-03-21 PROCEDURE — 76830 TRANSVAGINAL US NON-OB: CPT | Performed by: INTERNAL MEDICINE

## 2024-04-01 RX ORDER — LISINOPRIL AND HYDROCHLOROTHIAZIDE 25; 20 MG/1; MG/1
1 TABLET ORAL DAILY
Qty: 90 TABLET | Refills: 0 | OUTPATIENT
Start: 2024-04-01

## 2024-04-01 NOTE — TELEPHONE ENCOUNTER
# HTN assoc with DM: tightly controlled so discontinue hydrochlorothiazide and cont lisinopril 20

## 2024-04-03 ENCOUNTER — TELEPHONE (OUTPATIENT)
Facility: CLINIC | Age: 39
End: 2024-04-03

## 2024-04-03 ENCOUNTER — OFFICE VISIT (OUTPATIENT)
Dept: OBGYN CLINIC | Facility: CLINIC | Age: 39
End: 2024-04-03
Payer: COMMERCIAL

## 2024-04-03 VITALS
SYSTOLIC BLOOD PRESSURE: 143 MMHG | HEART RATE: 108 BPM | WEIGHT: 229.25 LBS | HEIGHT: 68.11 IN | DIASTOLIC BLOOD PRESSURE: 99 MMHG | BODY MASS INDEX: 34.75 KG/M2

## 2024-04-03 DIAGNOSIS — N93.9 ABNORMAL UTERINE BLEEDING (AUB): Primary | ICD-10-CM

## 2024-04-03 DIAGNOSIS — D25.1 INTRAMURAL LEIOMYOMA OF UTERUS: ICD-10-CM

## 2024-04-03 DIAGNOSIS — Z30.431 ENCOUNTER FOR MANAGEMENT OF INTRAUTERINE CONTRACEPTIVE DEVICE (IUD), UNSPECIFIED IUD MANAGEMENT TYPE: ICD-10-CM

## 2024-04-03 PROCEDURE — 3008F BODY MASS INDEX DOCD: CPT | Performed by: STUDENT IN AN ORGANIZED HEALTH CARE EDUCATION/TRAINING PROGRAM

## 2024-04-03 PROCEDURE — 3077F SYST BP >= 140 MM HG: CPT | Performed by: STUDENT IN AN ORGANIZED HEALTH CARE EDUCATION/TRAINING PROGRAM

## 2024-04-03 PROCEDURE — 99214 OFFICE O/P EST MOD 30 MIN: CPT | Performed by: STUDENT IN AN ORGANIZED HEALTH CARE EDUCATION/TRAINING PROGRAM

## 2024-04-03 PROCEDURE — 3080F DIAST BP >= 90 MM HG: CPT | Performed by: STUDENT IN AN ORGANIZED HEALTH CARE EDUCATION/TRAINING PROGRAM

## 2024-04-03 RX ORDER — MISOPROSTOL 100 UG/1
TABLET ORAL
Qty: 2 TABLET | Refills: 0 | Status: SHIPPED | OUTPATIENT
Start: 2024-04-03

## 2024-04-03 NOTE — TELEPHONE ENCOUNTER
----- Message from Elvie Cox MD sent at 4/3/2024 10:47 AM CDT -----  Regarding: Hysteroscopy w myomectomy, IUD removal and Mirena IUD replacement  Please schedule patient for hysteroscopy with myomectomy, Kyleena IUD removal and Mirena IUD replacement

## 2024-04-03 NOTE — H&P
[facility]  Obstetrics and Gynecology   History & Physical      Chief complaint:   Chief Complaint   Patient presents with    Ultrasound     Discuss recent ultrasound results, fibroids were found, 3 of them     Menstrual Problem     Periods are heavy and long     IUD     Kyleena IUD, inserted on 21       Subjective:     HPI: Elle Gutierrez is a 38 year old  presenting for AUB-L  Uses the following pronouns: she/her.    Her PCP is Radha Lockwood MD.    Today, patient would like to discuss:    #AUB-L heavy and painful abnormal uterine bleeding despite IUD x3 years.  Leiomyoma seen on US.    IUD placed 3 years ago - stopped periods until 6 months ago.  Periods have returned with a vengeance.  Regular periods every months lasting 5-7 days.  Having spotting now.  Very painful.   Saw NP 3 months ago and started norethindrone but unhelpful.  Patient is tearful - reports having the IUD placed was a very traumatic and painful experience for her.    Would like option to have children one day but not in the place mentally or in her relationship to try to have one.  She is tearful discussing a miscarriage she had.      OB History  OB History    Para Term  AB Living   1 0 0 0 1 0   SAB IAB Ectopic Multiple Live Births   0 1 0 0 0     OB History    Para Term  AB Living   1       1     SAB IAB Ectopic Multiple Live Births     1            # Outcome Date GA Lbr Denzel/2nd Weight Sex Delivery Anes PTL Lv   1 IAB  6w0d              ROS negative unless otherwise stated above    Depression Scale      PHQ-2 SCORE: 0  , done 3/1/2024   Last [place] Suicide Screening on 4/3/2024 was No Risk.      Meds:  Current Outpatient Medications on File Prior to Visit   Medication Sig Dispense Refill    lisinopril 20 MG Oral Tab Take 1 tablet (20 mg total) by mouth every evening. Replaces combination lisinopril-hydrochlorothiazide 90 tablet 0    famotidine 20 MG Oral Tab Take 1 tablet (20 mg total) by  mouth 2 (two) times daily. 180 tablet 3    metFORMIN HCl 1000 MG Oral Tab Take 1 tablet (1,000 mg total) by mouth 2 (two) times daily with meals. 180 tablet 3    Tirzepatide (MOUNJARO) 10 MG/0.5ML Subcutaneous Solution Pen-injector Inject 10 mg into the skin once a week. 2 mL 1    omeprazole 20 MG Oral Capsule Delayed Release Take 1 capsule (20 mg total) by mouth every morning before breakfast. (Patient taking differently: Take 1 capsule (20 mg total) by mouth daily as needed (hearburn).) 90 capsule 0    cetirizine 10 MG Oral Tab Take 1 tablet (10 mg total) by mouth daily. 90 tablet 0    fluticasone propionate 50 MCG/ACT Nasal Suspension 2 sprays by Nasal route in the morning. 48 mL 3     No current facility-administered medications on file prior to visit.       PMH:  Past Medical History:   Diagnosis Date    Abnormal uterine bleeding     Allergic rhinitis 2010    Anemia     Diabetes mellitus (HCC)     Dysmenorrhea     Esophageal reflux 2013    Essential hypertension 2015    Hypertension     Obesity     Type II or unspecified type diabetes mellitus without mention of complication, not stated as uncontrolled        All:  Allergies   Allergen Reactions    Bis Subcit-Metronid-Tetracyc OTHER (SEE COMMENTS)     Nerve pain    Pcn [Penicillins] ANAPHYLAXIS    Pylera      Nerve pain      Sulfa Antibiotics ANAPHYLAXIS       PSH:  Past Surgical History:   Procedure Laterality Date    COLONOSCOPY  2016    UPPER GI ENDOSCOPY PERFORMED  1/2016    gastritis, duodenal ulcer, HP negative       Social History:  Social History     Socioeconomic History    Marital status: Single   Tobacco Use    Smoking status: Never    Smokeless tobacco: Never   Vaping Use    Vaping Use: Never used   Substance and Sexual Activity    Alcohol use: Yes     Alcohol/week: 2.0 standard drinks of alcohol     Types: 1 Glasses of wine, 1 Standard drinks or equivalent per week     Comment: 2 x/ month    Drug use: No    Sexual activity: Yes     Partners: Male      Birth control/protection: I.U.D.     Comment: None   Other Topics Concern    Caffeine Concern No    Exercise No    Seat Belt Yes    Special Diet No    Stress Concern No    Weight Concern Yes        Family History:  Family History   Problem Relation Age of Onset    Anemia Mother     Diabetes Mother     Hypertension Mother     Cancer Maternal Grandfather     Cancer Other     Heart Disease Other     Stroke Neg        Immunization History:  Immunization History   Administered Date(s) Administered    Covid-19 Vaccine Pfizer 30 mcg/0.3 ml 03/25/2021, 04/15/2021, 12/02/2021    FLULAVAL 6 months & older 0.5 ml Prefilled syringe (67023) 09/29/2017, 10/06/2021, 10/26/2022    FLUZONE 6 months and older PFS 0.5 ml (45075) 09/29/2017, 10/22/2019, 10/07/2020, 10/06/2021, 10/09/2023    Influenza 10/22/2019, 10/07/2020    Pneumovax 23 08/12/2020    TDAP 08/04/2015         Objective:     Vitals:    04/03/24 1018   BP: (!) 143/99   Pulse: 108   Weight: 229 lb 4.5 oz (104 kg)   Height: 68.11\"       Body mass index is 34.75 kg/m².    Physical Exam:     General: normal appearance  HEENT: normocephalic, no male pattern baldness  Breast: normal contour  Respiratory: normal work of breathing, no extra use of accessory muscles  Cardiac: normal rate  Abdominal: Nontender to palpation, no masses palpated  MSK: normal range of motion  Neuro: normal movement, normal sensory  Skin: no abnormalities seen    Pelvic Exam: deferred       Labs:  Lab Results   Component Value Date    WBC 11.0 12/15/2015    RBC 5.18 06/19/2023    HGB 11.8 01/30/2023    HCT 38.5 01/30/2023    MCV 73.0 06/19/2023    MCH 22.4 06/19/2023    MCHC 30.7 06/19/2023    RDW 14.4 12/15/2015    .0 01/30/2023        Lab Results   Component Value Date     06/19/2023    BUN 14 03/11/2022    BUNCREA NOT APPLICABLE 03/11/2022    CREATSERUM 0.91 03/11/2022    ANIONGAP 4 08/07/2021    GFR 67 09/27/2017    GFRNAA 81 03/11/2022    GFRAA 94 03/11/2022    CA 9.8 03/11/2022     OSMOCALC 288 2021    ALKPHO 73 12/15/2015    AST 16.0 2023    ALT 31.0 2023    BILT 0.4 12/15/2015    TP 8.5 (H) 12/15/2015    ALB 3.8 12/15/2015    GLOBULIN 2.9 08/10/2015    AGRATIO 1.6 08/10/2015     2022    K 4.3 2022     2022    CO2 26 2022       Lab Results   Component Value Date    CHOLEST 189 2022    TRIG 151 (H) 2022    HDL 50 2022     (H) 2022    VLDL 22 2021    TCHDLRATIO 3.8 2022    NONHDLC 139 (H) 2022        Lab Results   Component Value Date    TSHT4 6.9 2023        Lab Results   Component Value Date     (H) 2021    A1C 6.3 (A) 2024         Imaging:        US pelvis FINDINGS on 3/21/2024:                UTERUS:  8.10 cm x 4.10 cm x 4.82 cm    Intramural fibroid in the left anterior uterine body measuring 13 x 12 x 12 mm.  Intramural fibroid along the posterior uterine fundus measuring 18 x 16 x 17 mm.  6 x 7 x 6 mm intramural fibroid in the anterior uterine fundus.  IUD in the endometrial   canal.  2 mm endometrial stripe.  RIGHT OVARY:  2.90 cm x 2.24 cm x 2.40 cm    Probable hemorrhagic functional cyst in the right ovary measuring 15 x 14 x 14 mm.   LEFT OVARY:  2.52 cm x 1.55 cm x 1.85 cm    The left ovary appears normal in size, shape, and echogenicity. No significant masses are identified.   CUL-DE-SAC:  Minimal nonspecific free fluid  OTHER:  Negative.    CONCLUSION:       1. Uterine fibroids as above.     2. 15 mm probable hemorrhagic functional cyst in the right ovary.     3. Unremarkable left ovary.     4. Minimal nonspecific free fluid in the pelvis may be physiologic.     Assessment:     Elle Gutierrez is a 38 year old  female presenting for AUB-L/I    #AUB  #Leiomyoma  #IUD/contraception  management  - Multiple small fibroids - appear intramural   - Kyleena in place since   - Discussed doing hysteroscopy with myomectomy if fibroids are entering the  uterine cavity, however, discussed that based on US, unlikely that I will be able to successfully do a myomectomy with hysteroscopy.  Discussed getting sampling.  Given very traumatic experience of IUD placement  vaginal exams in general, will remove IUD and replaced it with a Mirena IUD for medical mgt of abnl uterine bleeding.  Patient in agreement w this plan.    Surgery consent    Risks of hysteroscopy w myomectomy, IUD replacement including but not limited to:    - Uterine perforation  - infection  - blood loss or hemorrhage to the point of needing a blood transfusion  - the inherent risk of a blood transfusion including infection and allergic reaction   - the risk of damaging adjacent organ structures including the bowel, bladder  - the risk of conversion to open surgery or needing additional procedures    Message sent to surgery schedulers    #preconception counseling  - currently not in a place in her relationship and personally to start having children  - may be interested in the future  - discussed freezing eggs, IVF  - Happy to send referral to [facility] once she is ready    Elvie Cox MD   [facility] - OBGYN    Note to patient and family:  The 21st Century Cures Act makes medical notes available to patients in the interest of transparency.  However, please be advised that this is a medical document.  It is intended as a peer to peer communication.  It is written in medical language and may contain abbreviations or verbiage that are technical and unfamiliar.  It may appear blunt or direct.  Medical documents are intended to carry relevant information, facts as evident, and the clinical opinion of the practitioner.

## 2024-04-18 DIAGNOSIS — Z30.431 SURVEILLANCE OF PREVIOUSLY PRESCRIBED INTRAUTERINE CONTRACEPTIVE DEVICE: ICD-10-CM

## 2024-04-18 DIAGNOSIS — N93.9 ABNORMAL UTERINE BLEEDING (AUB): Primary | ICD-10-CM

## 2024-04-18 DIAGNOSIS — D25.1 INTRAMURAL LEIOMYOMA OF UTERUS: ICD-10-CM

## 2024-07-18 ENCOUNTER — TELEPHONE (OUTPATIENT)
Dept: INTERNAL MEDICINE CLINIC | Facility: CLINIC | Age: 39
End: 2024-07-18

## 2024-07-31 ENCOUNTER — PATIENT MESSAGE (OUTPATIENT)
Dept: INTERNAL MEDICINE CLINIC | Facility: CLINIC | Age: 39
End: 2024-07-31

## 2024-08-01 NOTE — TELEPHONE ENCOUNTER
From: Elle Gutierrez  To: Radha Lockwood  Sent: 7/31/2024 6:37 PM CDT  Subject: Blood pressure update    Hi Dr. Lockwood,    My blood pressure has maintained at a better level for the past week. My latest readings have been very good. I know I am due to get bloodwork. Would you be able to forward the labs to [facility] Fax [phone]. I know I will need blood pressure medication refilled and to get back on injection for blood sugar and weight loss. I have been using the lisinopril-HCTZ 20/25mg.    Elle

## 2024-08-08 RX ORDER — LISINOPRIL AND HYDROCHLOROTHIAZIDE 20; 25 MG/1; MG/1
1 TABLET ORAL DAILY
Qty: 90 TABLET | Refills: 0 | OUTPATIENT
Start: 2024-08-08

## 2024-08-08 NOTE — TELEPHONE ENCOUNTER
Name from pharmacy: LISINOPRIL-HCTZ 20/25MG TABLETS          Will file in chart as: LISINOPRIL-HYDROCHLOROTHIAZIDE 20-25 MG Oral Tab    The original prescription was discontinued on 3/1/2024 by Radha Lockwood MD. Renewing this prescription may not be appropriate.    Sig: Take 1 tablet by mouth daily.    Original sig: TAKE 1 TABLET BY MOUTH DAILY    Disp: 90 tablet    Refills: 0 (Pharmacy requested: Not specified)    Start: 8/8/2024    Class: Normal    Non-formulary    Last ordered: 7 months ago (12/28/2023) by Radha Lockwood MD    Last refill: 12/28/2023    Rx #: [id number]    Hypertension Medications Protocol [id number]/2024 02:41 PM   Protocol Details CMP or BMP in past 12 months    Last BP reading less than 140/90    EGFRCR or GFRAA > 50    In person appointment or virtual visit in the past 12 mos or appointment in next 3 mos      To be filled at: [id number] - [place], [place] - [address] AT [facility] [address], [phone], [phone]     LOV:03-  RTC: 6 months   Labs:04/07/2023  Last filled :03/03/2024    No future appointments.

## 2024-08-09 ENCOUNTER — PATIENT MESSAGE (OUTPATIENT)
Dept: INTERNAL MEDICINE CLINIC | Facility: CLINIC | Age: 39
End: 2024-08-09

## 2024-08-12 RX ORDER — TIRZEPATIDE 10 MG/.5ML
10 INJECTION, SOLUTION SUBCUTANEOUS WEEKLY
Qty: 2 ML | Refills: 1 | Status: SHIPPED | OUTPATIENT
Start: 2024-08-12

## 2024-08-12 RX ORDER — LISINOPRIL AND HYDROCHLOROTHIAZIDE 25; 20 MG/1; MG/1
1 TABLET ORAL DAILY
Qty: 90 TABLET | Refills: 3 | Status: SHIPPED | OUTPATIENT
Start: 2024-08-12

## 2024-08-13 LAB
ALT: 19
AST: 10
CREATININE, RANDOM U: 200 MG/DL
FERRITIN: 57
HCT: 40.7
HDL CHOLESTEROL: 62 MG/DL (ref 60–60)
HGB: 12.4
LDL CHOLESTEROL: 158.8 MG/DL (ref ?–130)
MEAN CELL VOLUME: 73.9
MEAN CORPUSCULAR HEMOGLOBIN: 22.5
MEAN CORPUSCULAR HGB CONC: 30.5
MICROALB/CREAT RATIO: <30 UG/MG CREAT
MICROALBUMIN: 30
PLT: 3.1
RED BLOOD COUNT: 5.51
TEST STRIP EXPIRATION DATE: 0 DATE
TEST STRIP LOT #: 0 NUMERIC
TOTAL CHOLESTEROL: 243 MG/DL (ref ?–200)
TRG: 111 MG/DL (ref ?–150)
TSH: 0.91 UIU/ML
WBC: 9.6

## 2024-08-23 ENCOUNTER — TELEPHONE (OUTPATIENT)
Facility: CLINIC | Age: 39
End: 2024-08-23

## 2024-08-23 NOTE — TELEPHONE ENCOUNTER
Spoke with patient and stated she is having IUD placement done in office. Would like to continue with previous plan of care recommended.   Per review of [employer] message from Dr. Wetzel on 4/18/24: Ativan, Cytotec, and extra strength Tylenol and Ibuprofen to be sent to pharmacy for patient to take prior to procedure. Patient to have someone drive her to appointment.  Understanding verbalized.

## 2024-08-23 NOTE — TELEPHONE ENCOUNTER
Patient schedule IUD procedure in office but would like medication as discussed with Dr. Cox to soften the cervix and help with anxiety. Please advise.

## 2024-08-26 DIAGNOSIS — Z97.5 IUD CONTRACEPTION: Primary | ICD-10-CM

## 2024-08-26 RX ORDER — LORAZEPAM 1 MG/1
TABLET ORAL
Qty: 2 TABLET | Refills: 0 | Status: SHIPPED | OUTPATIENT
Start: 2024-08-26

## 2024-08-26 RX ORDER — IBUPROFEN 600 MG/1
600 TABLET, FILM COATED ORAL EVERY 6 HOURS PRN
Qty: 4 TABLET | Refills: 0 | Status: SHIPPED | OUTPATIENT
Start: 2024-08-26 | End: 2024-08-27

## 2024-08-26 RX ORDER — MISOPROSTOL 200 UG/1
TABLET ORAL
Qty: 2 TABLET | Refills: 0 | Status: SHIPPED | OUTPATIENT
Start: 2024-08-26

## 2024-08-26 RX ORDER — ACETAMINOPHEN 500 MG
1000 TABLET ORAL EVERY 6 HOURS PRN
Qty: 8 TABLET | Refills: 0 | Status: SHIPPED | OUTPATIENT
Start: 2024-08-26 | End: 2024-08-27

## 2024-10-03 ENCOUNTER — OFFICE VISIT (OUTPATIENT)
Dept: OBGYN CLINIC | Facility: CLINIC | Age: 39
End: 2024-10-03
Payer: COMMERCIAL

## 2024-10-03 VITALS
DIASTOLIC BLOOD PRESSURE: 74 MMHG | WEIGHT: 209.19 LBS | SYSTOLIC BLOOD PRESSURE: 130 MMHG | HEIGHT: 69 IN | HEART RATE: 90 BPM | BODY MASS INDEX: 30.98 KG/M2

## 2024-10-03 DIAGNOSIS — Z30.433 ENCOUNTER FOR REMOVAL AND REINSERTION OF INTRAUTERINE CONTRACEPTIVE DEVICE (IUD): Primary | ICD-10-CM

## 2024-10-03 PROCEDURE — 3008F BODY MASS INDEX DOCD: CPT | Performed by: STUDENT IN AN ORGANIZED HEALTH CARE EDUCATION/TRAINING PROGRAM

## 2024-10-03 PROCEDURE — 58300 INSERT INTRAUTERINE DEVICE: CPT | Performed by: STUDENT IN AN ORGANIZED HEALTH CARE EDUCATION/TRAINING PROGRAM

## 2024-10-03 PROCEDURE — 3075F SYST BP GE 130 - 139MM HG: CPT | Performed by: STUDENT IN AN ORGANIZED HEALTH CARE EDUCATION/TRAINING PROGRAM

## 2024-10-03 PROCEDURE — 58301 REMOVE INTRAUTERINE DEVICE: CPT | Performed by: STUDENT IN AN ORGANIZED HEALTH CARE EDUCATION/TRAINING PROGRAM

## 2024-10-03 PROCEDURE — 3078F DIAST BP <80 MM HG: CPT | Performed by: STUDENT IN AN ORGANIZED HEALTH CARE EDUCATION/TRAINING PROGRAM

## 2024-10-03 NOTE — PROCEDURES
Procedure: Intrauterine device placement      Indication: abnormal uterine bleeding, contraception  Type of IUD: Kyleena - removed, Mirena - placed    Pre-procedure:  UPT: negative  Risks, benefits and alternative discussed.  Risks discussed include uterine perforation, IUD expulsion.  Absolute pregnancy risk is very low, but if pregnancy were confirmed with IUD in place, 50% chance it would be an ectopic pregnancy.      Procedure steps:  Patient placed in lithotomy position.  Bimanual exam done.  Speculum was inserted in the vagina.  Vaginal walls and cervix were cleaned with betadine x3.  Kyleena IUD strings grasped and pulled - entire kyleena removed and showed to patient.  Anterior lip of cervix was then grasped by tenaculum.  Uterus was sounded to 8 cm.  IUD was placed with the insertion device in the usual fashion at the fundus of the uterus.  Insertion device was removed and strings were cut to 2-3 cm from the cervix.  Tenaculum was removed.  Hemostasis to tenaculum site was achieved with pressure.  Speculum was removed.  Patient tolerated the procedure well.    Post Procedure Instructions:  - Experiencing intermittent cramping and abnormal uterine bleeding for up to 2 months is normal.  Thereafter, your periods should lighten or go away completely.    - Use a pad to catch any blood or discharge  - Call office if bleeding heavier than a period, chills, fever or severe abdominal pain  - Pelvic rest (nothing in the vagina - douching, vaginal sex or tampons) x1 week  - Follow up in 6 weeks for string check

## 2024-10-16 ENCOUNTER — OFFICE VISIT (OUTPATIENT)
Dept: INTERNAL MEDICINE CLINIC | Facility: CLINIC | Age: 39
End: 2024-10-16
Payer: COMMERCIAL

## 2024-10-16 VITALS
TEMPERATURE: 97 F | HEART RATE: 76 BPM | WEIGHT: 206.81 LBS | BODY MASS INDEX: 30.63 KG/M2 | OXYGEN SATURATION: 99 % | DIASTOLIC BLOOD PRESSURE: 76 MMHG | SYSTOLIC BLOOD PRESSURE: 130 MMHG | HEIGHT: 69 IN

## 2024-10-16 DIAGNOSIS — E78.5 HYPERLIPIDEMIA ASSOCIATED WITH TYPE 2 DIABETES MELLITUS (HCC): ICD-10-CM

## 2024-10-16 DIAGNOSIS — E11.69 HYPERLIPIDEMIA ASSOCIATED WITH TYPE 2 DIABETES MELLITUS (HCC): ICD-10-CM

## 2024-10-16 DIAGNOSIS — F90.9 ATTENTION DEFICIT HYPERACTIVITY DISORDER (ADHD), UNSPECIFIED ADHD TYPE: ICD-10-CM

## 2024-10-16 DIAGNOSIS — D50.0 IRON DEFICIENCY ANEMIA DUE TO CHRONIC BLOOD LOSS: ICD-10-CM

## 2024-10-16 DIAGNOSIS — E66.9 TYPE 2 DIABETES MELLITUS WITH OBESITY (HCC): ICD-10-CM

## 2024-10-16 DIAGNOSIS — I15.2 HYPERTENSION ASSOCIATED WITH TYPE 2 DIABETES MELLITUS (HCC): Primary | ICD-10-CM

## 2024-10-16 DIAGNOSIS — E11.59 HYPERTENSION ASSOCIATED WITH TYPE 2 DIABETES MELLITUS (HCC): Primary | ICD-10-CM

## 2024-10-16 DIAGNOSIS — E11.69 TYPE 2 DIABETES MELLITUS WITH OBESITY (HCC): ICD-10-CM

## 2024-10-16 LAB — HEMOGLOBIN A1C: 5.7 % (ref 4.3–5.6)

## 2024-10-16 PROCEDURE — 3044F HG A1C LEVEL LT 7.0%: CPT | Performed by: INTERNAL MEDICINE

## 2024-10-16 PROCEDURE — 90471 IMMUNIZATION ADMIN: CPT | Performed by: INTERNAL MEDICINE

## 2024-10-16 PROCEDURE — 83036 HEMOGLOBIN GLYCOSYLATED A1C: CPT | Performed by: INTERNAL MEDICINE

## 2024-10-16 PROCEDURE — 3078F DIAST BP <80 MM HG: CPT | Performed by: INTERNAL MEDICINE

## 2024-10-16 PROCEDURE — 3075F SYST BP GE 130 - 139MM HG: CPT | Performed by: INTERNAL MEDICINE

## 2024-10-16 PROCEDURE — 3061F NEG MICROALBUMINURIA REV: CPT | Performed by: INTERNAL MEDICINE

## 2024-10-16 PROCEDURE — 3060F POS MICROALBUMINURIA REV: CPT | Performed by: INTERNAL MEDICINE

## 2024-10-16 PROCEDURE — 99214 OFFICE O/P EST MOD 30 MIN: CPT | Performed by: INTERNAL MEDICINE

## 2024-10-16 PROCEDURE — 90656 IIV3 VACC NO PRSV 0.5 ML IM: CPT | Performed by: INTERNAL MEDICINE

## 2024-10-16 PROCEDURE — 3008F BODY MASS INDEX DOCD: CPT | Performed by: INTERNAL MEDICINE

## 2024-10-16 RX ORDER — BUPROPION HYDROCHLORIDE 150 MG/1
150 TABLET ORAL DAILY
COMMUNITY
Start: 2024-09-18

## 2024-10-16 NOTE — PATIENT INSTRUCTIONS
Please stop metformin and continue mounjaro 10 mg weekly for the next 3 months and then let me know if you want to increase the dose. We can go up to a maximum of 15 mg weekly.     We do not want your sugars to be less than 75. Please make sure you are eating small frequent meals throughout the day to prevent low blood sugars.     Please complete your blood counts in November, 2024. You do not need to fast. You will be due for routine, comprehensive labs in February,2 025.     I recommend the following vaccines -  Stay up to date with COVID vaccines.

## 2024-10-16 NOTE — PROGRESS NOTES
Elle Gutierrez is a 39 year old female.     HPI:   Patient presents for the following issues  AUB - kyleena IUD removed and replaced with mirena IUD on 10/4/2024. She does have uterine fibroids. Menses remain heavy and painful.   DM - A1C 5.7 today. Tolerating mounjaro well. She is wearing cgm. States she had one alram for hypoglycemia 55 but she felt fine. It woke her up from sleep. However, her sugars do drop to 60-70s if she skips meals.   Weight management - she has lost 60 pounds since starting mounjaro. She stopped mounjaro and gained back the weight. She denies any s/e. She is eating healthy regular meals. She feels she has plateud over past few weeks and she wants to increase mounjaro.   HLP - LDL is very high.   HTN - we discontinued hydrochlorothiazide at last OV but her pressures kemi when she gained weight in July so we resumed her hydrochlorothiazide. Denies LH or dizziness. She brought in her home machine with log.   ADHD - diagnosed by Josefa Gamble NP. She did not want to be on stimulants b/c they made her nervous and kemi her BP. She started buproprion one month ago. She has not noticed any changes/improvement of her symptoms. Her main symptoms include motivation and focusing challenges and avoidance. She is currently behind on her work notes. 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   Left posterior ankle pain - has improved with ice and nsaids. It is much better since July.       REVIEW OF SYSTEMS:   GENERAL HEALTH: feels well otherwise. No f/c  NEURO: denies any headaches, LH, dizzyness, LOC, falls  VISION: denies any blurred or double vision  RESPIRATORY: denies shortness of breath, cough, or congestion  CARDIOVASCULAR: denies chest pain, pressure or palpitations  GI: denies abdominal pain, constipation, diarrhea, n/v, BRBPR, melena  : no dysuria  or hematuria  PSYCH: denies SI/HI  EXT: denies edema    Wt Readings from Last 6 Encounters:   10/03/24 209 lb 3.2 oz (94.9 kg)   04/05/24  229 lb (103.9 kg)   04/03/24 229 lb 4.5 oz (104 kg)   03/01/24 229 lb 3.2 oz (104 kg)   10/09/23 253 lb 8.5 oz (115 kg)   07/21/23 266 lb 0.8 oz (120.7 kg)       Allergies   Allergen Reactions    Pcn [Penicillins] ANAPHYLAXIS     Years ago. Pt denies any other PCN abx since. Denies any other organ involvement.     Bis Subcit-Metronid-Tetracyc OTHER (SEE COMMENTS)     Nerve pain    Pylera      Nerve pain      Sulfa Antibiotics ANAPHYLAXIS       Family History   Problem Relation Age of Onset    Anemia Mother     Diabetes Mother     Hypertension Mother     Cancer Maternal Grandfather     Cancer Other     Heart Disease Other     Stroke Neg       Past Medical History:    Abnormal uterine bleeding    Allergic rhinitis    Anemia    Diabetes mellitus (HCC)    Dysmenorrhea    Esophageal reflux    Essential hypertension    High blood pressure    History of stomach ulcers    Hypertension    Obesity    Type II or unspecified type diabetes mellitus without mention of complication, not stated as uncontrolled      Past Surgical History:   Procedure Laterality Date    Colonoscopy  2016    Colonoscopy      Dilation/curettage,diagnostic      Insert intrauterine device  08/2021    Upper gi endoscopy performed  01/2016    gastritis, duodenal ulcer, HP negative    Upper gi endoscopy,exam        Social History:    Social History     Socioeconomic History    Marital status: Single   Tobacco Use    Smoking status: Never    Smokeless tobacco: Never   Vaping Use    Vaping status: Never Used   Substance and Sexual Activity    Alcohol use: Yes     Alcohol/week: 2.0 standard drinks of alcohol     Types: 1 Glasses of wine, 1 Standard drinks or equivalent per week     Comment: 2 x/ month    Drug use: Yes     Types: Cannabis     Comment: occasional edible    Sexual activity: Yes     Partners: Male     Birth control/protection: I.U.D.     Comment: None   Other Topics Concern    Caffeine Concern No    Exercise No    Seat Belt Yes    Special Diet No     Stress Concern No    Weight Concern Yes           EXAM:   /76 (BP Location: Left arm, Patient Position: Sitting, Cuff Size: adult)   Pulse 76   Temp 97.3 °F (36.3 °C) (Temporal)   Ht 5' 9\" (1.753 m)   Wt 206 lb 12.8 oz (93.8 kg)   LMP 09/05/2024 (Exact Date)   SpO2 99%   BMI 30.54 kg/m²   GENERAL: A&O well developed, well nourished,in no apparent distress  SKIN: no rashes,no suspicious lesions  HEENT: atraumatic, MMM, throat is clear  NECK: supple, no jvd, no thyromegaly  LUNGS: clear to auscultation bilateraly, no c/w/r  CARDIO: RRR without g/m/r  GI: soft non tender nondistended no hsm bs throughout  NEURO: CN 2-12 grossly intact  PSYCH: pleasant  EXTREMITIES: no cyanosis, clubbing or edema      ASSESSMENT AND PLAN:   # ADHD - diagnosed by Josefa Gamble NP. She did not want to be on stimulants b/c they made her nervous and kemi her BP. She started buproprion one month ago. She has not noticed any changes/improvement of her symptoms. Re-discussed using stimulants if her symptoms are affecting her quality of life and work life.   # Abnormal Uterine Bleeding - kyleena IUD removed and replaced with mirena IUD on 10/4/2024. She does have uterine fibroids. Menses remain heavy and painful. Check CBC next month.   # GERD and history of H pylori: well controlled on famotidine and dietary measures.   # History of Duodenal Ulcer (EGD on 1/2016) and H Pylori: see above.   # Obese, BMI 30 in DM: continues to lose weight with mounjaro. She is still losing 1-2 pounds per week so cont 10 mg weekly.   - starting weight 266 lbs in 7/29023  - did not tolerate contrave 2/2 GI upset, topamax caused foggyness, trulicity caused injection site reaction, phentermine kemi BP, wellbutrin (not effective)  # HTN assoc with DM: well controlled on lisinopril-hydrochlorothiazide.   # HLP assoc with DM: no indication for statin now, cont weight loss. But if LDL remains > 70 after age 40, we will need to start statin   # Type 2  Diabetes: c/b hypoglycemia. Needs to eat regular meals. Stop metformin, cont mounjaro.   - DM eye exam done 2024 by Clarence Bernstein w/o diabetic retinopathy in either eye.   - Foot Exam: done 2024, cont nightly foot exams  - LDL: see above  - BP: see above  - micro alb:creat - normal in 2024  - Depression Screen: done 2024   - not on ASA  # Migraines: cont abortive tx with nsaids  # Allergic rhinitis: not as well controlled with zyrtec and flonase. Add saline rinses.   # Health Maintenance: CPX on 3/1/2024  Colon Cancer Screening: underwent colonoscopy for GERD many years ago. Not indicated again until age 45   Cervical Cancer Screening: neg cytology, HPV in 10/2023. Repeat in 5 years.   Breast Cancer Screening: annual breast exam.  Bone Health: educated on dietary calcium/vit D, weight bearing exercises.   Vaccines: TDAP 2015, pneumovac 23 in 2020. gets annual flu shot.   Hepatitis C Screen: Ab negative       The patient indicates understanding of these issues and agrees to the plan.  The patient is asked to return in 3/2025 for wellness exam.   Radha Lockwood MD

## 2024-12-17 ENCOUNTER — OFFICE VISIT (OUTPATIENT)
Facility: CLINIC | Age: 39
End: 2024-12-17
Payer: COMMERCIAL

## 2024-12-17 VITALS
SYSTOLIC BLOOD PRESSURE: 118 MMHG | BODY MASS INDEX: 27.55 KG/M2 | WEIGHT: 186 LBS | HEIGHT: 69 IN | DIASTOLIC BLOOD PRESSURE: 66 MMHG | HEART RATE: 116 BPM

## 2024-12-17 DIAGNOSIS — N93.9 ABNORMAL UTERINE BLEEDING (AUB): ICD-10-CM

## 2024-12-17 DIAGNOSIS — Z11.3 SCREEN FOR STD (SEXUALLY TRANSMITTED DISEASE): ICD-10-CM

## 2024-12-17 DIAGNOSIS — Z01.419 WELL WOMAN EXAM WITH ROUTINE GYNECOLOGICAL EXAM: Primary | ICD-10-CM

## 2024-12-17 DIAGNOSIS — Z12.4 SCREENING FOR CERVICAL CANCER: ICD-10-CM

## 2024-12-17 DIAGNOSIS — Z12.31 ENCOUNTER FOR SCREENING MAMMOGRAM FOR MALIGNANT NEOPLASM OF BREAST: ICD-10-CM

## 2024-12-17 PROCEDURE — 99459 PELVIC EXAMINATION: CPT | Performed by: STUDENT IN AN ORGANIZED HEALTH CARE EDUCATION/TRAINING PROGRAM

## 2024-12-17 PROCEDURE — 3008F BODY MASS INDEX DOCD: CPT | Performed by: STUDENT IN AN ORGANIZED HEALTH CARE EDUCATION/TRAINING PROGRAM

## 2024-12-17 PROCEDURE — 3078F DIAST BP <80 MM HG: CPT | Performed by: STUDENT IN AN ORGANIZED HEALTH CARE EDUCATION/TRAINING PROGRAM

## 2024-12-17 PROCEDURE — 99395 PREV VISIT EST AGE 18-39: CPT | Performed by: STUDENT IN AN ORGANIZED HEALTH CARE EDUCATION/TRAINING PROGRAM

## 2024-12-17 PROCEDURE — 87624 HPV HI-RISK TYP POOLED RSLT: CPT | Performed by: STUDENT IN AN ORGANIZED HEALTH CARE EDUCATION/TRAINING PROGRAM

## 2024-12-17 PROCEDURE — 87491 CHLMYD TRACH DNA AMP PROBE: CPT | Performed by: STUDENT IN AN ORGANIZED HEALTH CARE EDUCATION/TRAINING PROGRAM

## 2024-12-17 PROCEDURE — 3074F SYST BP LT 130 MM HG: CPT | Performed by: STUDENT IN AN ORGANIZED HEALTH CARE EDUCATION/TRAINING PROGRAM

## 2024-12-17 PROCEDURE — 87591 N.GONORRHOEAE DNA AMP PROB: CPT | Performed by: STUDENT IN AN ORGANIZED HEALTH CARE EDUCATION/TRAINING PROGRAM

## 2024-12-17 NOTE — H&P
Referred by: Iglesia Martinez MD; Medical Diagnosis (from order):    Diagnosis Information      Diagnosis    719.42 (ICD-9-CM) - M25.522 (ICD-10-CM) - Left elbow pain                Occupational Therapy -  Daily Treatment Note    Visit:  10   Diagnosis Precautions: Date of Surgery: 10/28/2020; Surgery: Left Lateral Epicondyle Release And Radial Tunnel Decompression - Left       SUBJECTIVE                                                                                                             Patient reports she has discontinued use of her wrist splint with improvement in pain over her radial tunnel region. She has noted increase in pain over lateral elbow. Shares she is following up with MD tomorrow.     Patient is currently 8 weeks post-op  Functional Change: The patient shares she has been removing her splint at work for a few hours at a time due to increased pain with when wearing the wrist cock-up splint.    Pain / Symptoms:  Pain rating (out of 10): Current: 7     OBJECTIVE                                                                                                                        TREATMENT                                                                                                                  Therapeutic Exercise:  Patient performed the following exercises/activities this date:    AROM of the elbow flexion and extenstion   AROM of the left forearm supination pronation small arc  AROM of the wrist into flexion extension foarearm in neutral   2# wt bicep curls 2 sets of 10  2# wt  press sets 2 sets of 10   1# wt wrist flexion extension forearm neutral        Manual Therapy:  STM to the ECRB/L EDC ECU deep STM with lift off   Scar massage       Therapeutic Activity:  Educated patient to return to [facility] for sleep only to allow arm to rest and to prevent patient from sleeping in an awkward posture. Patient verbalized understanding.     Skilled input: verbal instruction/cues and tactile  [facility]  Obstetrics and Gynecology   History & Physical      Chief complaint:   Chief Complaint   Patient presents with    Wellness Visit     WWE  - IUD check  - abstracted and placed in doctors [facility] for review and recommendations.      Menopause       Subjective:     HPI: Elle Gutierrez is a 39 year old  presenting for WWE and IUD string check.    Her PCP is Radha Lockwood MD.    Menstrual history:  Had mirena IUD placed on 10/03/2024 (replaced kyleena) for HMB.  Since placement, has had 2 regular periods.  They were not as heavy and lasted 7 days - a little bit longer than normal.  Still having cramping week prior to period and first two days of periods.  Overall is ok with it.      Of note, she has known leiomyomata seen on 2024.  Intramural fibroid in the left anterior uterine body measuring 13 x 12 x 12 mm.  Intramural fibroid along the posterior uterine fundus measuring 18 x 16 x 17 mm.  6 x 7 x 6 mm intramural fibroid in the anterior uterine fundus.     Had tried norethindrone 5 years ago.  Switched to IUD due to PCP's recommendation.      Birth control history:  - Current BC: IUD  - Past BC: COCPs,    Family planning / Preconception:  - Children desired: goes back and forth, is in an on and off relationship.  Not actively trying with the IUD     Sexual history:  - Sexually active? Yes   - Sexual satisfaction?: Yes  - Sexual preference: men   - STD history: 20s trichomonas   - Interested in STD testing:  - Dyspareunia: can be painful when it's been a few months   - Post coital bleeding: this last time she had some bleeding 2 weeks ago, a couple months after IUD placement    Gynecologic Hygiene:  - history of recurrent bacteria vaginosis, yeast, UTIs: no  - Vulvar: Water, soap   - Douche? No   - Underwear fully covers vulva?yes   - Underwear with cotton liner or made of cotton? Yes     Cancer Screening:  Family history of gynecologic cancers (including breast):  - maternal: fibroid history    - paternal: no   Cervical CA:   - last pap/HPV: 2023 nl pap smear and HPV  - due for one today? : no but would like it given your abnormal bleeding   - History of abnl pap/HPV: had a colposcopy in her 20s   - received HPV vaccine: in undergrad   Breast cancer:   - any breast issues today?: no   - last mammogram  for lumps - all fine from [facility]   - due for mammogram today? : yes - ordered it for 41 yo   Colon cancer:   - family history: maternal grandfather - 60s   - last colonoscopy: 2 for \"stomach issues\" like stomach ulcer, H pylori, all good from that perspective.  Takes famotidine daily.  Will get her next one at 44 yo     Other screening:  Osteoporosis:   - family history: no  - last DEXA: -  Lifestyle:   - Nutrition: does incorporate whole food/plant based foods to their diet   - Exercise: working on it   - Sleep: 5-8 hours   - Stress mgt / coping strategies: watch mindless TV   - People who support your health:   - Goals of weight loss?: went from 236 to 186 since July     OB History  OB History    Para Term  AB Living   1 0 0 0 1 0   SAB IAB Ectopic Multiple Live Births   0 1 0 0 0     OB History    Para Term  AB Living   1       1     SAB IAB Ectopic Multiple Live Births     1            # Outcome Date GA Lbr Denzel/2nd Weight Sex Type Anes PTL Lv   1 IAB 2011 6w0d                ROS negative unless otherwise stated above    Meds:  Medications Ordered Prior to Encounter[1]    PMH:  Past Medical History:    Abnormal uterine bleeding    Allergic rhinitis    Anemia    Diabetes mellitus (HCC)    Dysmenorrhea    Esophageal reflux    Essential hypertension    High blood pressure    History of stomach ulcers    Hypertension    Obesity    Type II or unspecified type diabetes mellitus without mention of complication, not stated as uncontrolled       All:  Allergies[2]    PSH:  Past Surgical History:   Procedure Laterality Date    Colonoscopy      Colonoscopy       instruction/cues    Writer verbally educated and received verbal consent for hand placement, positioning of patient, and techniques to be performed today from patient for therapist position for techniques and hand placement and palpation for techniques as described above and how they are pertinent to the patient's plan of care.    Home Exercise Program: *above indicates provided as part of home exercise program  AROM elbow   AROM forearm  AROM of the shoulder   splint    Ultrasound (66017)  Location: wrist extensors  Position: sitting  Duty Cycle: 100%  Frequency: 3 Mhz  Intensity (w/cm2): 1.0  Duration: 8 minutes  Results: decreased pain  Reaction: no adverse reaction to treatment      ASSESSMENT                                                                                                             Patient demonstrated improvement in tissue mobility post US and STM this date. Slight decrease in pain post session. Plan to continue as patient is able to tolerate and will continue per MD recommendations.   Pain/symptoms after session: 3    Patient Education:   Results of above outlined education: Verbalizes understanding, Demonstrates understanding and Needs reinforcement          Procedures and total treatment time documented Time Entry flowsheet.   Dilation/curettage,diagnostic      Insert intrauterine device  08/2021    Upper gi endoscopy performed  01/2016    gastritis, duodenal ulcer, HP negative    Upper gi endoscopy,exam         Social History:  Social History     Socioeconomic History    Marital status: Single   Tobacco Use    Smoking status: Never    Smokeless tobacco: Never   Vaping Use    Vaping status: Never Used   Substance and Sexual Activity    Alcohol use: Yes     Alcohol/week: 2.0 standard drinks of alcohol     Types: 1 Glasses of wine, 1 Standard drinks or equivalent per week     Comment: 2 x/ month    Drug use: Not Currently     Types: Cannabis    Sexual activity: Yes     Partners: Male     Birth control/protection: I.U.D.     Comment: None   Other Topics Concern    Caffeine Concern No    Exercise No    Seat Belt Yes    Special Diet No    Stress Concern No    Weight Concern Yes        Family History:  Family History   Problem Relation Age of Onset    Anemia Mother     Diabetes Mother     Hypertension Mother     No Known Problems Maternal Grandmother     Colon Cancer Maternal Grandfather         dx age 60s    No Known Problems Paternal Grandmother     No Known Problems Paternal Grandfather     Stroke Neg     Prostate Cancer Neg     Pancreatic Cancer Neg     Uterine Cancer Neg     Ovarian Cancer Neg     Breast Cancer Neg        Immunization History:  Immunization History   Administered Date(s) Administered    Covid-19 Vaccine Pfizer 30 mcg/0.3 ml 03/25/2021, 04/15/2021, 12/02/2021    FLULAVAL 6 months & older 0.5 ml Prefilled syringe (85132) 09/29/2017, 10/06/2021, 10/26/2022    FLUZONE 6 months and older PFS 0.5 ml (60986) 09/29/2017, 10/22/2019, 10/07/2020, 10/06/2021, 10/09/2023    Influenza 10/22/2019, 10/07/2020    Influenza Vaccine, trivalent (IIV3), PF 0.5mL (40180) 10/16/2024    Pneumovax 23 08/12/2020    TDAP 08/04/2015         Objective:     Vitals:    12/17/24 0837   BP: 118/66   Pulse: 116   Weight: 186 lb (84.4 kg)   Height: 69\"        Body mass index is 27.47 kg/m².    Physical Exam:     General: normal appearance - obvious weight loss  HEENT: normocephalic, no male pattern baldness, no acne  Breast: normal contour, no masses or lesions, no nipple discharge, chest hair not seen, redudant skin  Respiratory: normal work of breathing, no extra use of accessory muscles  Cardiac: normal rate  Abdominal: Nontender to palpation, no masses palpated  MSK: normal range of motion  Neuro: normal movement, normal sensory  Skin: no abnormalities seen    Pelvic:  Speculum Exam:  - normal appearing vulva, perineum, anus - lots of redundant skin  - normal appearing urethral meatus, urethra  - Stage V deepa pubic hair development  - normal appearing vagina, well estrogenized with ruggae, physiologic discharge  -  cervix without masses - strings 3 cm long  Bimanual exam:  - uterus is mobile and with normal descent.  No masses appreciated.  No uterine or adnexal tenderness.  No bladder pain  - Pelvic floor is non tender  - normal rectal exam     Labs:  Lab Results   Component Value Date    WBC 9.6 08/05/2024    RBC 5.51 08/05/2024    HGB 12.4 08/05/2024    HCT 40.7 08/05/2024    MCV 73.9 08/05/2024    MCH 22.5 08/05/2024    MCHC 30.5 08/05/2024    RDW 14.4 12/15/2015    PLT 3.10 08/05/2024        Lab Results   Component Value Date     06/19/2023    BUN 14 03/11/2022    BUNCREA NOT APPLICABLE 03/11/2022    CREATSERUM 0.91 03/11/2022    ANIONGAP 4 08/07/2021    GFR 67 09/27/2017    GFRNAA 81 03/11/2022    GFRAA 94 03/11/2022    CA 9.8 03/11/2022    OSMOCALC 288 08/07/2021    ALKPHO 73 12/15/2015    AST 10.0 08/05/2024    ALT 19.0 08/05/2024    BILT 0.4 12/15/2015    TP 8.5 (H) 12/15/2015    ALB 3.8 12/15/2015    GLOBULIN 2.9 08/10/2015    AGRATIO 1.6 08/10/2015     03/11/2022    K 4.3 03/11/2022     03/11/2022    CO2 26 03/11/2022       Lab Results   Component Value Date    CHOLEST 243 08/05/2024    TRIG 111 08/05/2024    HDL 62 (A)  2024    .8 2024    VLDL 22 2021    TCHDLRATIO 3.8 2022    NONHDLC 139 (H) 2022        Lab Results   Component Value Date    TSH 0.91 2024    TSHT4 6.9 2023        Lab Results   Component Value Date     (H) 2021    A1C 5.7 (A) 10/16/2024         Imaging:  No results found.     Assessment:     Elle Gutierrez is a 39 year old  female presenting for WWE.    #WWE  [X] done w Dr. Lockwood 2024    #AUB-leiomyoma  - leading etiology(ies):  Follow up  [x] TVUS 2024 - three small intramural fibroids seen  [ ] if no improvement, consider endometrial sampling: biopsy v hysteroscopy  Management  [ ] non-hormonal mgt: scheduled ibuprofen, tylenol  [x] hormonal mgt: mirena IUD placed 10/03/2024    #Contraception   Mirena IUD    #STD screening  [ ] f/u STD panel    #Cervical Cancer Screening  Last pap nl and HPV neg in   [x] gardasil vaccine (available from 9 to 46 yo)    #Breast Cancer Screening  [X] clinical breast exam today  [ ] f/u mammogram ordered for  (turns 41 yo on 2024)    #Lifestyle counseling based on recommendations from the American College of Lifestyle Medicine  1) Nutrition: extensive scientific evidence supports a diet that is predominantly whole-food and plant based as an important strategy in health optimization.  Such a diet is rich in fiber, antioxidants and is nutrient dense (ex. Minimally processed vegetables, fruits, whole grains, legumes, nuts and seeds)  2) Physical activity: at least 150 minutes of moderate exercise per week (anything that gets your heart beating faster).  You could divide this time into 30 minutes per day for 5 days.  2 of these days should be devoted to whole body muscle-strengthening/building activities (weight lifting, for example).  3) Sleep: 7 to 9 hours per night of restorative sleep.  You can use black out curtains, white noise machine and minimize screen time to do this.    4) Continue to  avoid or limit risky substances like alcohol, nicotine, vaping, drugs, prescription opioids.  If you need help - through medication or counseling - to do this, please contact me so I can get you a referral or resources to support you.  5) Stress: Continue developing coping strategies to decrease stress.  6) Leverage the power of relationships and social networks to help reinforce health behaviors.  Studies show people are more successful at achieving health goals if the people they live with are supporting and even working towards those same health goals.    Return of care in 1 year or PRN     Elvie Cox MD   [facility] - OBGYN    Note to patient and family:  The 21st Century Cures Act makes medical notes available to patients in the interest of transparency.  However, please be advised that this is a medical document.  It is intended as a peer to peer communication.  It is written in medical language and may contain abbreviations or verbiage that are technical and unfamiliar.  It may appear blunt or direct.  Medical documents are intended to carry relevant information, facts as evident, and the clinical opinion of the practitioner.         [1]   Current Outpatient Medications on File Prior to Visit   Medication Sig Dispense Refill    Tirzepatide (MOUNJARO) 10 MG/0.5ML Subcutaneous Solution Pen-injector Inject 10 mg into the skin once a week. 6 mL 1    lisinopril-hydroCHLOROthiazide 20-25 MG Oral Tab Take 1 tablet by mouth daily. 90 tablet 3    famotidine 20 MG Oral Tab Take 1 tablet (20 mg total) by mouth 2 (two) times daily. 180 tablet 3    cetirizine 10 MG Oral Tab Take 1 tablet (10 mg total) by mouth daily. 90 tablet 0    fluticasone propionate 50 MCG/ACT Nasal Suspension 2 sprays by Nasal route in the morning. 48 mL 3    buPROPion  MG Oral Tablet 24 Hr Take 1 tablet (150 mg total) by mouth daily.      omeprazole 20 MG Oral Capsule Delayed Release Take 1 capsule (20 mg total) by mouth every morning  before breakfast. (Patient taking differently: Take 1 capsule (20 mg total) by mouth daily as needed (gerd).) 90 capsule 0     No current facility-administered medications on file prior to visit.   [2]   Allergies  Allergen Reactions    Pcn [Penicillins] ANAPHYLAXIS     Years ago. Pt denies any other PCN abx since. Denies any other organ involvement.     Bis Subcit-Metronid-Tetracyc OTHER (SEE COMMENTS)     Nerve pain    Pylera      Nerve pain      Sulfa Antibiotics ANAPHYLAXIS

## 2024-12-18 LAB
C TRACH DNA SPEC QL NAA+PROBE: NEGATIVE
HPV E6+E7 MRNA CVX QL NAA+PROBE: NEGATIVE
N GONORRHOEA DNA SPEC QL NAA+PROBE: NEGATIVE

## 2024-12-24 LAB
.: NORMAL
.: NORMAL

## 2025-01-03 RX ORDER — FLUTICASONE PROPIONATE 50 MCG
2 SPRAY, SUSPENSION (ML) NASAL DAILY
Qty: 48 ML | Refills: 0 | Status: SHIPPED | OUTPATIENT
Start: 2025-01-03

## 2025-01-03 NOTE — TELEPHONE ENCOUNTER
Protocol passed    Requesting fluticasone  propionate   LOV: 10/16/24  RTC: 6 months  Last Relevant Labs:   Filled: 10/16/22 #48ml with 3 refills    Future Appointments   Date Time Provider Department Center   6/6/2025 10:20 AM [facility]   12/18/2025  9:00 AM Elvie Cox MD [facility]

## 2025-01-24 ENCOUNTER — PATIENT MESSAGE (OUTPATIENT)
Dept: INTERNAL MEDICINE CLINIC | Facility: CLINIC | Age: 40
End: 2025-01-24

## 2025-01-24 NOTE — TELEPHONE ENCOUNTER
KS: Scheduled patient for follow-up visit from urgent care for elevated uric acid and foot pain. Please see MCM below regarding matter.    Future Appointments   Date Time Provider Department Center   1/27/2025  1:30 PM Radha Lockwood MD [facility] [place] [facility]      [facility]     Spoke with patient to assess if patient has improved from yesterday's appointment. Patient states pain occurring in left foot. Had trouble weightbearing. Visited urgent care and was given prednisone last night. Reports foot feels slightly better, requesting to see MD for follow-up. Elevating foot presently. This RN advised patient to continue to elevate foot.

## 2025-01-27 ENCOUNTER — TELEMEDICINE (OUTPATIENT)
Dept: INTERNAL MEDICINE CLINIC | Facility: CLINIC | Age: 40
End: 2025-01-27
Payer: COMMERCIAL

## 2025-01-27 VITALS — WEIGHT: 184.31 LBS | BODY MASS INDEX: 27 KG/M2

## 2025-01-27 DIAGNOSIS — E66.9 TYPE 2 DIABETES MELLITUS WITH OBESITY (HCC): ICD-10-CM

## 2025-01-27 DIAGNOSIS — E11.59 HYPERTENSION ASSOCIATED WITH TYPE 2 DIABETES MELLITUS (HCC): ICD-10-CM

## 2025-01-27 DIAGNOSIS — E11.69 TYPE 2 DIABETES MELLITUS WITH OBESITY (HCC): ICD-10-CM

## 2025-01-27 DIAGNOSIS — Z11.3 SCREEN FOR SEXUALLY TRANSMITTED DISEASES: ICD-10-CM

## 2025-01-27 DIAGNOSIS — I15.2 HYPERTENSION ASSOCIATED WITH TYPE 2 DIABETES MELLITUS (HCC): ICD-10-CM

## 2025-01-27 DIAGNOSIS — E11.69 HYPERLIPIDEMIA ASSOCIATED WITH TYPE 2 DIABETES MELLITUS (HCC): ICD-10-CM

## 2025-01-27 DIAGNOSIS — M10.272 ACUTE DRUG-INDUCED GOUT OF LEFT FOOT: Primary | ICD-10-CM

## 2025-01-27 DIAGNOSIS — E78.5 HYPERLIPIDEMIA ASSOCIATED WITH TYPE 2 DIABETES MELLITUS (HCC): ICD-10-CM

## 2025-01-27 DIAGNOSIS — F90.2 ATTENTION DEFICIT HYPERACTIVITY DISORDER (ADHD), COMBINED TYPE: ICD-10-CM

## 2025-01-27 RX ORDER — COLCHICINE 0.6 MG/1
0.6 TABLET ORAL DAILY
Qty: 10 TABLET | Refills: 0 | Status: SHIPPED | OUTPATIENT
Start: 2025-01-27

## 2025-01-27 RX ORDER — LISINOPRIL 20 MG/1
20 TABLET ORAL DAILY
Qty: 90 TABLET | Refills: 3 | Status: SHIPPED | OUTPATIENT
Start: 2025-01-27

## 2025-01-27 RX ORDER — TIRZEPATIDE 10 MG/.5ML
10 INJECTION, SOLUTION SUBCUTANEOUS WEEKLY
COMMUNITY
Start: 2025-01-19

## 2025-01-27 RX ORDER — BUPROPION HYDROCHLORIDE 300 MG/1
300 TABLET ORAL DAILY
COMMUNITY
Start: 2025-01-19

## 2025-01-27 RX ORDER — IBUPROFEN 800 MG/1
800 TABLET, FILM COATED ORAL EVERY 8 HOURS PRN
Qty: 30 TABLET | Refills: 0 | Status: SHIPPED | OUTPATIENT
Start: 2025-01-27 | End: 2026-01-22

## 2025-01-27 NOTE — PROGRESS NOTES
Elle Gutierrez is a 39 year old female.   Virtual Telephone Check-In    This visit is conducted using Telemedicine with live, interactive video and audio.   Patient understands and accepts financial responsibility for any deductible, co-insurance and/or co-pays associated with this service.    Telehealth outside of [facility]  Telehealth Verbal Consent   I conducted a telehealth visit with RONY on January 27, 2025 which was completed using two-way, real-time interactive audio and video  communication. This has been done in good daniel to provide continuity of care in the best interest of the provider-patient relationship, due to the COVID -19 public health crisis/national emergency where restrictions of face-to-face office visits are ongoing. Every conscious effort was taken to allow for sufficient and adequate time to complete the visit.  The patient was made aware of the limitations of the telehealth visit, including treatment limitations as no physical exam could be performed.  The patient was advised to call 911 or to go to the ER in case there was an emergency.  The patient was also advised of the potential privacy & security concerns related to the telehealth platform.   The patient was made aware of where to find [facility]'s notice of privacy practices, telehealth consent form and other related consent forms and documents.  which are located on the [facility] website. The patient verbally agreed to telehealth consent form, related consents and the risks discussed.    Lastly, the patient confirmed that they were in [place].   Included in this visit, time may have been spent reviewing labs, medications, radiology tests and decision making. Appropriate medical decision-making and tests are ordered as detailed in the plan of care above.  Coding/billing information is submitted for this visit based on complexity of care and/or time spent for the visit.  Time spent: 30 minutes      HPI:   Patient presents for the following  issues.   She presented to [facility] and [facility] on January 23rd (records are not available). She could put pressure on her left foot, had intense pain and throbbing. Symptoms started on Jan 20th but they were mild. Pain progressed and could not bear weight on left foot. She developed swelling over lateral portion of her left foot. Did not involve the toes. Even a comforter caused the pain. Uric acid was elevated. They gave her a course of prednisone. She was feeling a bit better until yesterday but her pain worsened yesterday. Swelling has resolved. No redness. She cannot put any pressure on her left foot. She has tried ice, magnesium oil and alleve but did not help. No particular dietarcy changes over past few weeks.   ADHD - wellbutrin is helping. Psychiatry is planning to increase her dose to 450 mg.   Abnormal uterine bleeding - she has had monthly menses that lasts a few days, no cramping, light flow since replacing her IUD in 10/2024  Weight management - she is eating healthy.  She wants to remain on mounjaro 10 mg.   HTN - home pressures are less than 130/80s      REVIEW OF SYSTEMS:   GENERAL HEALTH: feels well otherwise. No f/c  NEURO: denies any headaches, LH, dizzyness, LOC, falls  VISION: denies any blurred or double vision  RESPIRATORY: denies shortness of breath, cough, or congestion  CARDIOVASCULAR: denies chest pain, pressure or palpitations  GI: denies abdominal pain, constipation, diarrhea, n/v, BRBPR, melena  : no dysuria or hematuria  PSYCH: mood is stable. Denies depression or anxiety.   EXT: denies edema     Wt Readings from Last 6 Encounters:   12/17/24 186 lb (84.4 kg)   10/16/24 206 lb 12.8 oz (93.8 kg)   10/03/24 209 lb 3.2 oz (94.9 kg)   04/05/24 229 lb (103.9 kg)   04/03/24 229 lb 4.5 oz (104 kg)   03/01/24 229 lb 3.2 oz (104 kg)       Allergies   Allergen Reactions    Pcn [Penicillins] ANAPHYLAXIS     Years ago. Pt denies any other PCN abx since. Denies any other organ  involvement.     Bis Subcit-Metronid-Tetracyc OTHER (SEE COMMENTS)     Nerve pain    Pylera      Nerve pain      Sulfa Antibiotics ANAPHYLAXIS       Family History   Problem Relation Age of Onset    Anemia Mother     Diabetes Mother     Hypertension Mother     No Known Problems Maternal Grandmother     Colon Cancer Maternal Grandfather         dx age 60s    No Known Problems Paternal Grandmother     No Known Problems Paternal Grandfather     Stroke Neg     Prostate Cancer Neg     Pancreatic Cancer Neg     Uterine Cancer Neg     Ovarian Cancer Neg     Breast Cancer Neg       Past Medical History:    Abnormal uterine bleeding    Allergic rhinitis    Anemia    Diabetes mellitus (HCC)    Dysmenorrhea    Esophageal reflux    Essential hypertension    High blood pressure    History of stomach ulcers    Hypertension    Obesity    Type II or unspecified type diabetes mellitus without mention of complication, not stated as uncontrolled      Past Surgical History:   Procedure Laterality Date    Colonoscopy  2016    Colonoscopy      Dilation/curettage,diagnostic      Insert intrauterine device  08/2021    Upper gi endoscopy performed  01/2016    gastritis, duodenal ulcer, HP negative    Upper gi endoscopy,exam        Social History:    Social History     Socioeconomic History    Marital status: Single   Tobacco Use    Smoking status: Never    Smokeless tobacco: Never   Vaping Use    Vaping status: Never Used   Substance and Sexual Activity    Alcohol use: Yes     Alcohol/week: 2.0 standard drinks of alcohol     Types: 1 Glasses of wine, 1 Standard drinks or equivalent per week     Comment: 2 x/ month    Drug use: Not Currently     Types: Cannabis    Sexual activity: Yes     Partners: Male     Birth control/protection: I.U.D.     Comment: None   Other Topics Concern    Caffeine Concern No    Exercise No    Seat Belt Yes    Special Diet No    Stress Concern No    Weight Concern Yes             EXAM:   Wt 184 lb 4.8 oz (83.6 kg)    LMP 11/30/2024 (Exact Date)   BMI 27.22 kg/m²   GENERAL: A&O well developed, well nourished,in no apparent distress  HEENT: atraumatic  LUNGS: speaking clearly and easily in complete sentences  NEURO: facial gestures grossly intact  PSYCH: pleasant  EXTREMITIES: left foot did not look swollen over video    ASSESSMENT AND PLAN:   # Acute Gout of left foot - first episode. Prednisone has not helped. Start colchicine but needs to complete her BMP asap. Can use nsaids if colchicine is not helping. Stop hydrochlorothiazide.   # ADHD - doing well on wellbutrin, following with psychiatry. She did not want to be on stimulants b/c they made her nervous and kemi her BP.   # Abnormal Uterine Bleeding - kyleena IUD removed and replaced with mirena IUD on 10/4/2024. She does have uterine fibroids. Her menstrual cycles are improving. She is due for CBC.   # GERD and history of H pylori: well controlled on famotidine and dietary measures.   # History of Duodenal Ulcer (EGD on 1/2016) and H Pylori: see above.   # Obese, BMI 30 in DM: has maintained significant weight loss on mounjaro 10 mg and lifestyle measures.   - starting weight 266 lbs in 7/29023  - did not tolerate contrave 2/2 GI upset, topamax caused foggyness, trulicity caused injection site reaction, phentermine kemi BP, wellbutrin (not effective)  # HTN assoc with DM: follow closely b/c we are stopping hydrochlorothiazide (gout) and cont lisinopril 20 mg. She will communicate home pressures to me.   # HLP assoc with DM: LDL uncontrolled. Discuss statin therapy at next visit.   # Type 2 Diabetes: c/b hypoglycemia. Needs to eat regular meals. cont mounjaro.   - DM eye exam done 2024 by Clarence Bernstein w/o diabetic retinopathy in either eye.   - Foot Exam: done 2024, cont nightly foot exams  - LDL: see above  - BP: see above  - micro alb:creat - normal in 2024  - Depression Screen: done 2025  - not on ASA  # Migraines: cont abortive tx with nsaids  # Allergic rhinitis:  not as well controlled with zyrtec and flonase. Add saline rinses.   # Health Maintenance: CPX on 3/1/2024  Colon Cancer Screening: underwent colonoscopy for GERD many years ago. Not indicated again until age 45   Cervical Cancer Screening: neg cytology, HPV in 10/2023. Repeat in 5 years.   Breast Cancer Screening: annual breast exam.  Bone Health: educated on dietary calcium/vit D, weight bearing exercises.   Vaccines: TDAP 2015, pneumovac 23 in 2020. gets annual flu shot.   Hepatitis C Screen: Ab kahlil Gamble NP - psychiatry           The patient indicates understanding of these issues and agrees to the plan.  The patient is asked to return in 3/2025 for wellness exam.   Radha Lockwood MD

## 2025-01-27 NOTE — PATIENT INSTRUCTIONS
History     Chief Complaint   Patient presents with    Irregular Heart Beat     HPI  Nadia Ladd is a 66 year old female who presents to the emergency room for concern of atrial fibrillation.  She does have paroxysmal atrial fibrillation, and is anticoagulated on Eliquis.  She said that she took her dose last evening a few hours late, but has otherwise been compliant with the medication.  She does receive chemotherapy infusion for colon cancer.  First time she received the infusion she went into A-fib with RVR.  Second time she received the infusion she went to [facility], and was monitored there but did not have any difficulty while hospitalized.  She started her chemotherapy as a home infusion this time, and feels that this morning she went into rapid A-fib.  Tried taking a dose of oral Cardizem, which she is prescribed to help with A-fib or palpitations, but did not have any improvement.  She is not feeling short of breath, no lightheadedness or dizziness, no nausea or vomiting, has not had diarrhea.  She otherwise feels relatively well.  Suspects that her A-fib is due to chemoinfusion.    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 Please continue lisinopril 20 mg every day. I have sent a new prescription. Please send me your blood pressure log 2 weeks after stopping hydrochlorothiazide.    Please complete your labs as soon as possible. I have ordered them to [facility]. You also need to give a urine sample.       Priority: Medium    Tinnitus, right 06/04/2023     Priority: Medium    MARYA (acute kidney injury) (H) 06/03/2023     Priority: Medium    Paroxysmal atrial fibrillation with RVR (H) 06/03/2023     Priority: Medium    Pyuria 06/03/2023     Priority: Medium    Hyperkalemia 05/10/2023     Priority: Medium    Colon cancer (H) 04/06/2023     Priority: Medium    Malignant neoplasm of sigmoid colon (H) 03/31/2023     Priority: Medium     [facility]- EOTD 8/10/2023 - [place]       Morbid obesity (H) 06/06/2019     Priority: Medium    Benign essential hypertension with target blood pressure below 140/90 10/10/2016     Priority: Medium    CARDIOVASCULAR SCREENING; LDL GOAL LESS THAN 130 10/02/2012     Priority: Medium    Advanced directives, counseling/discussion 02/09/2012     Priority: Medium     Advance Directive Problem List Overview:   Name Relationship Phone    Primary Health Care Agent            Alternative Health Care Agent          Discussed advance care planning with patient; information given to patient to review. 2/9/2012         Prediabetes 02/09/2012     Priority: Medium    Obesity 02/09/2012     Priority: Medium    Crohn's disease of large intestine without complication (H) 02/15/2011     Priority: Medium    Iron deficiency anemia due to chronic blood loss 10/21/2010     Priority: Medium    B12 deficiency anemia 10/21/2010     Priority: Medium    Arthropathia 08/05/2010     Priority: Medium    Dermatitis-dishydrotic eczema-severe 08/05/2010     Priority: Medium    Mild pulmonary hypertension (H) 04/06/2010     Priority: Medium        Past Medical History:    Past Medical History:   Diagnosis Date    Arthropathia 08/05/2010    B12 deficiency anemia 10/21/2010    Benign essential hypertension with target blood pressure below 140/90 10/10/2016    CARDIOVASCULAR SCREENING; LDL GOAL LESS THAN 160 10/31/2010    Crohn's colitis (H) 02/15/2011    Dermatitis-dishydrotic eczema-severe 08/05/2010    Hiatal hernia     HTN  (hypertension) 2011    Iron deficiency anemia 10/21/2010    Malignant neoplasm of sigmoid colon (H)     Obesity     Primary pulmonary hypertension (H) 2010       Past Surgical History:    Past Surgical History:   Procedure Laterality Date    COLECTOMY WITHOUT COLOSTOMY N/A 2023    Procedure: Laparoscopic Converted to Open Total abdominal colectomy;  Surgeon: Jerome Ashley MD;  Location: [facility]    COLONOSCOPY  10/11/10    COLONOSCOPY N/A 2023    Procedure: ATTEMPTED COLONOSCOPY WITH SIGMOID STRICTURE BIOPSY;  Surgeon: Jonathan Alcocer MD;  Location: [facility]    ESOPHAGOSCOPY, GASTROSCOPY, DUODENOSCOPY (EGD), COMBINED N/A 2023    Procedure: ESOPHAGOGASTRODUODENOSCOPY, WITH BIOPSY;  Surgeon: Jonathan Alcocer MD;  Location: [facility]    HYSTERECTOMY TOTAL ABDOMINAL, BILATERAL SALPINGO-OOPHORECTOMY, COMBINED N/A 2023    Procedure: Hysterectomy total abdominal, bilateral salpingo-oophorectomy;  Surgeon: Sunitha Olea MD;  Location: [facility]    INSERT PORT VASCULAR ACCESS Right 2023    Procedure: Ultrasound-guided right internal jugular venous access port placement with fluoroscopy;  Surgeon: Martin Thomas DO;  Location: [facility]    IR PORT CHECK RIGHT  2023    SIGMOIDOSCOPY FLEXIBLE N/A 2023    Procedure: Sigmoidoscopy flexible;  Surgeon: Jerome Ashley MD;  Location: [facility]    SURGICAL HISTORY OF -   76    Perineorrhaphy, for widening vaginal orifice    Z EXPLORATORY OF ABDOMEN      laparoscopy       Family History:    Family History   Problem Relation Age of Onset    Hypertension Mother         on meds, alive    Cerebrovascular Disease Father         stroke about age 65,  of cancer at 68 yrs    Diabetes Father         eventually took insulin    Anemia Father         Pernisios anemia    Kidney Disease Niece         kidney transplant    Kidney Disease Nephew         kidney transplant    Venous thrombosis No family hx of     Anesthesia  "Reaction No family hx of        Social History:  Marital Status:   [2]  Social History     Tobacco Use    Smoking status: Never     Passive exposure: Current    Smokeless tobacco: Never   Substance Use Topics    Alcohol use: No    Drug use: No        Medications:    apixaban ANTICOAGULANT (ELIQUIS ANTICOAGULANT) 5 MG tablet  cyanocobalamin 1000 MCG TBCR  dexamethasone (DECADRON) 4 MG tablet  diltiazem (CARDIZEM) 30 MG tablet  diltiazem (CARDIZEM) 30 MG tablet  Ferrous Sulfate (IRON) 325 (65 Fe) MG tablet  MAGNESIUM OXIDE 400 (240 Mg) MG tablet  metoprolol succinate ER (TOPROL XL) 25 MG 24 hr tablet  Multiple Vitamins-Iron (MULTI-DAY PLUS IRON PO)  sodium bicarbonate 650 MG tablet          Review of Systems   All other systems reviewed and are negative.      Physical Exam   BP: (!) 129/106  Pulse: (!) 138  Temp: 97.8  F (36.6  C)  Resp: 20  Height: 154.9 cm (5' 1\")  Weight: 80.7 kg (178 lb)  SpO2: 95 %      Physical Exam  Vitals and nursing note reviewed.   Constitutional:       General: She is not in acute distress.     Appearance: Normal appearance. She is not diaphoretic.   HENT:      Head: Atraumatic.      Mouth/Throat:      Mouth: Mucous membranes are moist.   Eyes:      General: No scleral icterus.     Conjunctiva/sclera: Conjunctivae normal.   Cardiovascular:      Rate and Rhythm: Tachycardia present. Rhythm irregular.      Heart sounds: Normal heart sounds.   Pulmonary:      Effort: Pulmonary effort is normal. No respiratory distress.      Breath sounds: Normal breath sounds.   Abdominal:      General: Abdomen is flat.      Palpations: Abdomen is soft.      Tenderness: There is no abdominal tenderness.   Musculoskeletal:      Cervical back: Neck supple.   Skin:     General: Skin is warm.      Findings: No rash.   Neurological:      Mental Status: She is alert.         ED Course                 Procedures              EKG Interpretation:      Interpreted by Estelle Tatum DO  Time reviewed: " 0845  Symptoms at time of EKG: Palpitations  Rhythm: atrial fibrillation - rapid  Rate: 138 bpm  Axis: Normal  Ectopy: none  Conduction: normal  ST Segments/ T Waves: No acute ischemic changes  Q Waves: none  Comparison to prior: Has previous sinus rhythm, sinus rhythm with PACs, and rapid A-fib noted on 6/3/2023    Clinical Impression: atrial fibrillation            EKG Interpretation:      Interpreted by Estelle Tatum DO  Time reviewed:1015   Symptoms at time of EKG: None   Rhythm: Atrial fibrillation - controlled  Rate: 111 bpm  Axis: Normal  Ectopy: None  Conduction: Normal  ST Segments/ T Waves: No ST-T wave changes and No acute ischemic changes  Q Waves: None  Comparison to prior: Slowed rate from arrival, still irregular, consistent with atrial fibrillation    Clinical Impression: atrial fibrillation (chronic)            EKG Interpretation:      Interpreted by Estelle Tatum DO  Time reviewed:1400   Symptoms at time of EKG: None   Rhythm: Normal sinus  and Atrial fibrillation - controlled  Rate: 91 bpm  Axis: Normal  Ectopy: None  Conduction: Normal  ST Segments/ T Waves: No ST-T wave changes and No acute ischemic changes  Q Waves: None  Comparison to prior: Improved rate    Clinical Impression: atrial fibrillation (chronic)         Critical Care time:  none               Results for orders placed or performed during the hospital encounter of 08/04/23 (from the past 24 hour(s))   CBC with platelets differential    Narrative    The following orders were created for panel order CBC with platelets differential.  Procedure                               Abnormality         Status                     ---------                               -----------         ------                     CBC with platelets and d...[[id number]]  Abnormal            Final result                 Please view results for these tests on the individual orders.   Basic metabolic panel   Result Value Ref Range    Sodium 142 136 -  145 mmol/L    Potassium 4.5 3.4 - 5.3 mmol/L    Chloride 104 98 - 107 mmol/L    Carbon Dioxide (CO2) 25 22 - 29 mmol/L    Anion Gap 13 7 - 15 mmol/L    Urea Nitrogen 37.8 (H) 8.0 - 23.0 mg/dL    Creatinine 1.54 (H) 0.51 - 0.95 mg/dL    Calcium 9.2 8.8 - 10.2 mg/dL    Glucose 126 (H) 70 - 99 mg/dL    GFR Estimate 37 (L) >60 mL/min/1.73m2   Troponin T, High Sensitivity   Result Value Ref Range    Troponin T, High Sensitivity 28 (H) <=14 ng/L   Magnesium   Result Value Ref Range    Magnesium 2.1 1.7 - 2.3 mg/dL   TSH with free T4 reflex   Result Value Ref Range    TSH 2.39 0.30 - 4.20 uIU/mL   CBC with platelets and differential   Result Value Ref Range    WBC Count 9.6 4.0 - 11.0 10e3/uL    RBC Count 4.56 3.80 - 5.20 10e6/uL    Hemoglobin 13.0 11.7 - 15.7 g/dL    Hematocrit 40.1 35.0 - 47.0 %    MCV 88 78 - 100 fL    MCH 28.5 26.5 - 33.0 pg    MCHC 32.4 31.5 - 36.5 g/dL    RDW 16.7 (H) 10.0 - 15.0 %    Platelet Count 133 (L) 150 - 450 10e3/uL    % Neutrophils 89 %    % Lymphocytes 5 %    % Monocytes 4 %    % Eosinophils 0 %    % Basophils 0 %    % Immature Granulocytes 2 %    NRBCs per 100 WBC 0 <1 /100    Absolute Neutrophils 8.5 (H) 1.6 - 8.3 10e3/uL    Absolute Lymphocytes 0.5 (L) 0.8 - 5.3 10e3/uL    Absolute Monocytes 0.4 0.0 - 1.3 10e3/uL    Absolute Eosinophils 0.0 0.0 - 0.7 10e3/uL    Absolute Basophils 0.0 0.0 - 0.2 10e3/uL    Absolute Immature Granulocytes 0.2 <=0.4 10e3/uL    Absolute NRBCs 0.0 10e3/uL   XR Chest Port 1 View    Narrative    XR CHEST PORT 1 VIEW   8/4/2023 9:39 AM     HISTORY: Palpitations, chest pressure    COMPARISON: 8/2/2023      Impression    IMPRESSION: Right IJ chest port in similar position. Stable elevation  right hemidiaphragm. Cardiac and mediastinal silhouettes are  unremarkable. No pleural effusion, pneumothorax or focal  consolidation.    LOS RENTERIA MD         SYSTEM ID:  [id number]   Troponin T, High Sensitivity   Result Value Ref Range    Troponin T, High Sensitivity 32  (H) <=14 ng/L   Troponin T, High Sensitivity   Result Value Ref Range    Troponin T, High Sensitivity 34 (H) <=14 ng/L       Medications   diltiazem (CARDIZEM) 125 mg in dextrose 5 % 125 mL infusion (0 mg/hr Intravenous Stopped 8/4/23 1741)   sodium chloride (PF) 0.9% PF flush 10-20 mL (20 mLs Intracatheter $Given 8/4/23 1249)   heparin 100 unit/mL injection 5-10 mL (5 mLs Intracatheter $Given 8/4/23 1250)   magnesium oxide (MAG-OX) tablet 400 mg (has no administration in time range)   metoprolol succinate ER (TOPROL XL) 24 hr tablet 25 mg (25 mg Oral $Given 8/4/23 1521)   sodium bicarbonate tablet 650 mg (has no administration in time range)   cyanocobalamin (VITAMIN B-12) tablet 1,000 mcg (1,000 mcg Oral $Given 8/4/23 1613)   0.9% sodium chloride BOLUS (0 mLs Intravenous Stopped 8/4/23 1741)   diltiazem (CARDIZEM) injection 20 mg (20 mg Intravenous $Given 8/4/23 0918)   diltiazem (CARDIZEM) tablet 30 mg (30 mg Oral $Given 8/4/23 1021)   0.9% sodium chloride BOLUS (0 mLs Intravenous Stopped 8/4/23 1431)       Assessments & Plan (with Medical Decision Making)  Nadia is a 66-year-old female with past medical history of paroxysmal atrial fibrillation, colorectal cancer on chemotherapy presenting to the emergency room for palpitations after onset of chemotherapy infusion this morning.  See history and focused physical exam as above  Pleasant 66-year-old female in no acute distress, is tachycardic, has an irregularly irregular rhythm noted, but is not diaphoretic or in any acute distress, is otherwise vitally stable.  She does have chemotherapy infusion currently running, as she does administer this at home.  She thinks that this may be atrial fibrillation due to the chemotherapy infusion, as it has happened during her previous infusions.  She did try taking home diltiazem without any relief.  We will try a bolus dose of IV, and will also give some IV fluids.  We will check blood work and continue to monitor  Blood work  as above.  Chest x-ray was clear.  Does have a slight elevation in her initial troponin at 28, but is not having any chest pains.  We will check a second troponin in 2 hours  Patient did have some improvement of her rate with the IV diltiazem bolus, but did not convert to a normal sinus rhythm.  She does have a labile rate, fluctuating between 80s and 130s.  She is asymptomatic at this time.  Thinks that it must be from her chemotherapy and that it will stop when she stopped the infusion at 1 PM.  We will continue to monitor in the ED in the interim.  We will give a second dose of IV fluids.  Also ordered for Cardizem infusion, but will have this at the bedside as patient does have labile rate and may not need an ongoing infusion.  Second troponin is slightly elevated, from 28 to 32, but patient still not having any chest pain.  Had a second EKG performed when rate had slowed, do not see any acute ischemic changes on EKG.  This slight elevation in troponin is thought to be demand due to rapid rate from A-fib.  We will check a third troponin in 2 hours  Third troponin is not much changed, is 34.  Again, EKG shows atrial fibrillation with controlled rate at 91 bpm.  Had a discussion with patient and her  who is back at the bedside.  Discussed procedure for cardioversion to hopefully achieve normal sinus rhythm, and this may be a good choice since patient is chronically anticoagulated, has been taking her medication, and is currently NPO.  Other option would be to resume diltiazem drip.  If we resume this drip, could monitor in the ED for a few hours and see if her rhythm converts, or could admit now to the hospital.  We would have staffing capability at this time to accept the patient to an ICU bed, which she would need for diltiazem drip.  Ultimately, after discussing all the options, patient would like to proceed with more conservative management, would like to initiate the Cardizem drip and see if she will  convert here in the ED.  We will keep her n.p.o. except for some of her morning meds with sips of water, in case she would like to proceed with cardioversion in a few hours.  She remains stable in the ED and we will continue to monitor.  Patient has been monitored here in the ED for 9 hours.  She had been initiated on Cardizem drip, and while she still remains in A-fib, her rate is much more controlled.  Maximum rate noted over the last few hours since drip initiation was up to 110s, but mostly fluctuated between 70 and 100.  Spoke with patient and her  again.  Again offered options for admission and observation, ongoing Cardizem drip, could attempt cardioversion, but since she is quite stable this is not an absolute necessity.  She is also welcome to be discharged at this time since she is already anticoagulated and does have prescription for diltiazem, and could continue to take this over the weekend until she is able to follow-up with cardiology, oncology, and her primary doctor early next week.  Ultimately, patient decided she would like to go home.  She is feeling completely asymptomatic and does not want to stay overnight in the ED ED or in the hospital.  She feels comfortable with the plan to trial medication at home and continue to manage her symptoms as needed.  Told her I would have a low threshold for her to return to the emergency room.  She should return promptly if she has any lightheadedness or dizziness, chest pain or shortness of breath, nausea or vomiting, if she feels worsening palpitations or notices a fast heart rate, or has any other concerns.  Otherwise, contact her oncology team to determine next steps with chemotherapy and if she needs to have her dose or administration changed in any way, she also contact her cardiology team and her primary doctor regarding this change in more persistent A-fib and daily medication.  All questions were answered.  Discharged in no distress     I have  reviewed the nursing notes.    I have reviewed the findings, diagnosis, plan and need for follow up with the patient.           Medical Decision Making  The patient's presentation was of moderate complexity (a chronic illness mild to moderate exacerbation, progression, or side effect of treatment).    The patient's evaluation involved:  ordering and/or review of 3+ test(s) in this encounter (see separate area of note for details)    The patient's management necessitated high risk (drug therapy requiring intensive monitoring (see separate area of note for details)).        Discharge Medication List as of 8/4/2023  5:43 PM        START taking these medications    Details   !! diltiazem (CARDIZEM) 30 MG tablet Take 1 tablet (30 mg) by mouth 3 times daily, Disp-90 tablet, R-0, Local Print       !! - Potential duplicate medications found. Please discuss with provider.          Final diagnoses:   Atrial fibrillation with rapid ventricular response (H)   Malignant neoplasm of sigmoid colon (H)   Chronic anticoagulation       8/4/2023   [facility] EMERGENCY DEPT       Estelle Tatum DO  08/04/23 4211

## 2025-02-03 ENCOUNTER — TELEPHONE (OUTPATIENT)
Dept: INTERNAL MEDICINE CLINIC | Facility: CLINIC | Age: 40
End: 2025-02-03

## 2025-02-03 LAB
ALBUMIN/GLOBULIN RATIO: 1.7
ALBUMIN: 4.8 G/DL
ALKALINE PHOSPHATASE: 60
ALT: 12
AST: 16
BILIRUBIN, TOTAL: 0.7 MG/DL
BUN/CREATININE RATIO: 16
CALCIUM: 10.1
CARBON DIOXIDE: 25
GLOBULIN: 2.8
GLUCOSE: 91
HEMOGLOBIN A1C: 5.6 %
POTASSIUM: 4
PROTEIN, TOTAL: 7.6
SODIUM: 138
TRIGLYCERIDES: 112 MG/DL
UREA NITROGEN (MG/DL) IN SER/PLAS: 20 MG/DL

## 2025-02-08 LAB
ABSOLUTE BASOPHILS: 78 CELLS/UL (ref 0–200)
ABSOLUTE EOSINOPHILS: 312 CELLS/UL (ref 15–500)
ABSOLUTE LYMPHOCYTES: 3401 CELLS/UL (ref 850–3900)
ABSOLUTE MONOCYTES: 515 CELLS/UL (ref 200–950)
ABSOLUTE NEUTROPHILS: 3494 CELLS/UL (ref 1500–7800)
ALT: 9 U/L (ref 6–29)
AST: 12 U/L (ref 10–30)
BASOPHILS: 1 %
BUN: 10 MG/DL (ref 7–25)
CALCIUM: 9.4 MG/DL (ref 8.6–10.2)
CARBON DIOXIDE: 27 MMOL/L (ref 20–32)
CHLAMYDIA TRACHOMATIS$RNA, TMA: NOT DETECTED
CHLORIDE: 103 MMOL/L (ref 98–110)
CREATININE, RANDOM URINE: 70 MG/DL (ref 20–275)
CREATININE: 0.89 MG/DL (ref 0.5–0.97)
EGFR: 85 ML/MIN/1.73M2
EOSINOPHILS: 4 %
FERRITIN: 82 NG/ML (ref 16–154)
GLUCOSE: 77 MG/DL (ref 65–139)
HEMATOCRIT: 36.7 % (ref 35–45)
HEMOGLOBIN: 11.3 G/DL (ref 11.7–15.5)
LYMPHOCYTES: 43.6 %
MCH: 22.9 PG (ref 27–33)
MCHC: 30.8 G/DL (ref 32–36)
MCV: 74.4 FL (ref 80–100)
MICROALBUMIN/CREATININE RATIO, RANDOM URINE: 4 MG/G CREAT
MICROALBUMIN: 0.3 MG/DL
MONOCYTES: 6.6 %
NEISSERIA GONORRHOEAE$RNA, TMA: NOT DETECTED
NEUTROPHILS: 44.8 %
PLATELET COUNT: 390 THOUSAND/UL (ref 140–400)
POTASSIUM: 4.2 MMOL/L (ref 3.5–5.3)
RDW: 15.3 % (ref 11–15)
RED BLOOD CELL COUNT: 4.93 MILLION/UL (ref 3.8–5.1)
SODIUM: 137 MMOL/L (ref 135–146)
T. PALLIDUM AB, EIA: NEGATIVE
WHITE BLOOD CELL COUNT: 7.8 THOUSAND/UL (ref 3.8–10.8)

## 2025-02-18 DIAGNOSIS — M10.272 ACUTE DRUG-INDUCED GOUT OF LEFT FOOT: Primary | ICD-10-CM

## 2025-02-18 NOTE — TELEPHONE ENCOUNTER
Mohinder Fournier  [facility] Clinical Staff52 minutes ago (3:05 PM)     AJ  Should this pt be triaged for the gout flare up?        Elle Gutierrez  P [facility] Front Office1 hour ago (2:39 PM)       Appointment for: Elle Gutierrez ([id number])  Visit type: MYCHART PHYSICAL ([id number])  3/26/2025 2:30 PM (30 minutes) with Radha Lockwood in [facility] [place]     Patient comments:  Weight, gout flare up in past months

## 2025-02-20 NOTE — TELEPHONE ENCOUNTER
KS: Pt doesn't feel her gout flare up has resolved. Has 2 pills left of colchicine, although didn't offer her much relief. Please advise on next steps. F/u OV prior to 3/26?

## 2025-02-21 RX ORDER — IBUPROFEN 800 MG/1
800 TABLET, FILM COATED ORAL EVERY 8 HOURS PRN
Qty: 30 TABLET | Refills: 0 | Status: SHIPPED | OUTPATIENT
Start: 2025-02-21 | End: 2026-02-16

## 2025-02-21 RX ORDER — ALLOPURINOL 100 MG/1
100 TABLET ORAL DAILY
Qty: 30 TABLET | Refills: 1 | Status: SHIPPED | OUTPATIENT
Start: 2025-02-21

## 2025-02-21 RX ORDER — ACETAMINOPHEN AND CODEINE PHOSPHATE 300; 30 MG/1; MG/1
1 TABLET ORAL EVERY 6 HOURS PRN
Qty: 15 TABLET | Refills: 0 | Status: SHIPPED | OUTPATIENT
Start: 2025-02-21

## 2025-03-26 ENCOUNTER — OFFICE VISIT (OUTPATIENT)
Dept: INTERNAL MEDICINE CLINIC | Facility: CLINIC | Age: 40
End: 2025-03-26
Payer: COMMERCIAL

## 2025-03-26 VITALS
OXYGEN SATURATION: 99 % | DIASTOLIC BLOOD PRESSURE: 84 MMHG | TEMPERATURE: 98 F | SYSTOLIC BLOOD PRESSURE: 120 MMHG | WEIGHT: 178.38 LBS | HEIGHT: 69 IN | BODY MASS INDEX: 26.42 KG/M2 | HEART RATE: 90 BPM

## 2025-03-26 DIAGNOSIS — E11.69 HYPERLIPIDEMIA ASSOCIATED WITH TYPE 2 DIABETES MELLITUS (HCC): ICD-10-CM

## 2025-03-26 DIAGNOSIS — Z86.39 HISTORY OF BEING OBESE: ICD-10-CM

## 2025-03-26 DIAGNOSIS — E78.5 HYPERLIPIDEMIA ASSOCIATED WITH TYPE 2 DIABETES MELLITUS (HCC): ICD-10-CM

## 2025-03-26 DIAGNOSIS — D50.0 IRON DEFICIENCY ANEMIA DUE TO CHRONIC BLOOD LOSS: Primary | ICD-10-CM

## 2025-03-26 DIAGNOSIS — F90.2 ATTENTION DEFICIT HYPERACTIVITY DISORDER (ADHD), COMBINED TYPE: ICD-10-CM

## 2025-03-26 DIAGNOSIS — E11.59 HYPERTENSION ASSOCIATED WITH TYPE 2 DIABETES MELLITUS (HCC): ICD-10-CM

## 2025-03-26 DIAGNOSIS — I15.2 HYPERTENSION ASSOCIATED WITH TYPE 2 DIABETES MELLITUS (HCC): ICD-10-CM

## 2025-03-26 DIAGNOSIS — M1A.2720 DRUG-INDUCED CHRONIC GOUT OF LEFT FOOT WITHOUT TOPHUS: ICD-10-CM

## 2025-03-26 DIAGNOSIS — K21.9 GASTROESOPHAGEAL REFLUX DISEASE, UNSPECIFIED WHETHER ESOPHAGITIS PRESENT: ICD-10-CM

## 2025-03-26 PROCEDURE — 3008F BODY MASS INDEX DOCD: CPT | Performed by: INTERNAL MEDICINE

## 2025-03-26 PROCEDURE — 99214 OFFICE O/P EST MOD 30 MIN: CPT | Performed by: INTERNAL MEDICINE

## 2025-03-26 PROCEDURE — 3074F SYST BP LT 130 MM HG: CPT | Performed by: INTERNAL MEDICINE

## 2025-03-26 PROCEDURE — 99395 PREV VISIT EST AGE 18-39: CPT | Performed by: INTERNAL MEDICINE

## 2025-03-26 PROCEDURE — 3061F NEG MICROALBUMINURIA REV: CPT | Performed by: INTERNAL MEDICINE

## 2025-03-26 PROCEDURE — 90715 TDAP VACCINE 7 YRS/> IM: CPT | Performed by: INTERNAL MEDICINE

## 2025-03-26 PROCEDURE — 90471 IMMUNIZATION ADMIN: CPT | Performed by: INTERNAL MEDICINE

## 2025-03-26 PROCEDURE — 3079F DIAST BP 80-89 MM HG: CPT | Performed by: INTERNAL MEDICINE

## 2025-03-26 RX ORDER — BUPROPION HYDROCHLORIDE 150 MG/1
150 TABLET, EXTENDED RELEASE ORAL DAILY
COMMUNITY
Start: 2025-01-30 | End: 2025-03-26

## 2025-03-26 RX ORDER — TIRZEPATIDE 5 MG/.5ML
5 INJECTION, SOLUTION SUBCUTANEOUS WEEKLY
COMMUNITY

## 2025-03-26 NOTE — PROGRESS NOTES
Elle Gutierrez is a 39 year old female.     HPI:   Patient presents for the following issues and physical exam   Gout - we started allopurinol one month ago. We stopped hydrochlorothiazide and she was eating a lot of protein through whey shakes. Her pain was bottom of left foot. She feels she is 90% recovered. Last needed medication for pain 2-3 weeks ago.   Vaccines - due for tdap.   DM eye exam - due in July.   DM  - using CGM.   HTN  - home pressures are less than 130/80s.   HLP - LDL is 127 on recent labs.   Weight management - has lost 85 lbs over past 2 years! She plans to decrease mounjaro to 5 mg to prevent more weight loss and b/c her DM is well controlled. She is still wearing CGM and has been experiencing more hypoglycemia over past 2 weeks. However, she is asymptomatic and it happens overnight most of the time.  I reviewed her monotir and average BS is 84.   Anemia - iron was causing constipation so does not take it regularly. IUD was replaced in 12/2024. She is having monthly menses, last 2 menses are lasting 7 days, heavy flow for 3-4 days, painful for 3 days but not impairing her QOL. Motrin helps.   ADHD - doing well on wellbutrin. Much better control on 450 mg dose but it kemi her BP too much.     REVIEW OF SYSTEMS:   GENERAL HEALTH: feels well otherwise. No f/c  NEURO: denies any headaches, LH, dizzyness, LOC, falls  VISION: denies any blurred or double vision  RESPIRATORY: denies shortness of breath, cough, or congestion  CARDIOVASCULAR: denies chest pain, pressure or palpitations  GI: denies abdominal pain, constipation, diarrhea, n/v, BRBPR, melena  : no dysuria  or hematuria.   SKIN: denies any unusual skin lesions or rashes  PSYCH: mood is stable. Denies depression or anxiety  EXT: denies edema     Wt Readings from Last 6 Encounters:   03/26/25 178 lb 6.4 oz (80.9 kg)   01/27/25 184 lb 4.8 oz (83.6 kg)   12/17/24 186 lb (84.4 kg)   10/16/24 206 lb 12.8 oz (93.8 kg)   10/03/24 209 lb 3.2 oz  (94.9 kg)   04/05/24 229 lb (103.9 kg)       Allergies   Allergen Reactions    Pcn [Penicillins] ANAPHYLAXIS     Years ago. Pt denies any other PCN abx since. Denies any other organ involvement.     Bis Subcit-Metronid-Tetracyc OTHER (SEE COMMENTS)     Nerve pain    Pylera      Nerve pain      Sulfa Antibiotics ANAPHYLAXIS       Family History   Problem Relation Age of Onset    Anemia Mother     Diabetes Mother     Hypertension Mother     No Known Problems Maternal Grandmother     Colon Cancer Maternal Grandfather         dx age 60s    No Known Problems Paternal Grandmother     No Known Problems Paternal Grandfather     Stroke Neg     Prostate Cancer Neg     Pancreatic Cancer Neg     Uterine Cancer Neg     Ovarian Cancer Neg     Breast Cancer Neg       Past Medical History:    Abnormal uterine bleeding    Allergic rhinitis    Anemia    Diabetes mellitus (HCC)    Dysmenorrhea    Esophageal reflux    Essential hypertension    High blood pressure    History of stomach ulcers    Hypertension    Obesity    Type II or unspecified type diabetes mellitus without mention of complication, not stated as uncontrolled      Past Surgical History:   Procedure Laterality Date    Colonoscopy  2016    Colonoscopy      Dilation/curettage,diagnostic      Insert intrauterine device  08/2021    Upper gi endoscopy performed  01/2016    gastritis, duodenal ulcer, HP negative    Upper gi endoscopy,exam        Social History:    Social History     Socioeconomic History    Marital status: Single   Tobacco Use    Smoking status: Never    Smokeless tobacco: Never   Vaping Use    Vaping status: Never Used   Substance and Sexual Activity    Alcohol use: Not Currently     Alcohol/week: 2.0 standard drinks of alcohol     Types: 1 Glasses of wine, 1 Standard drinks or equivalent per week    Drug use: Not Currently     Types: Cannabis    Sexual activity: Yes     Partners: Male     Birth control/protection: I.U.D.     Comment: None   Other Topics  Concern    Caffeine Concern No    Exercise No    Seat Belt Yes    Special Diet No    Stress Concern No    Weight Concern Yes           EXAM:   /84 (BP Location: Left arm)   Pulse 90   Temp 97.8 °F (36.6 °C) (Temporal)   Ht 5' 9\" (1.753 m)   Wt 178 lb 6.4 oz (80.9 kg)   LMP 11/30/2024 (Exact Date)   SpO2 99%   BMI 26.35 kg/m²   GENERAL: A&O well developed, well nourished,in no apparent distress  SKIN: no rashes,no suspicious lesions  HEENT: atraumatic, MMM, throat is clear  NECK: supple, no jvd, no thyromegaly  LUNGS: clear to auscultation bilateraly, no c/w/r  CARDIO: RRR without g/m/r  GI: soft non tender nondistended no hsm bs throughout  NEURO: CN 2-12 grossly intact  PSYCH: pleasant  EXTREMITIES: no cyanosis, clubbing or edema  Bilateral barefoot skin diabetic exam is normal, visualized feet and the appearance is normal.  Bilateral monofilament/sensation of both feet is normal.  Pulsation pedal pulse exam of both lower legs/feet is normal as well.      ASSESSMENT AND PLAN:   # Gout of left foot - experienced pain for prolonged amount of time (almost 2 months) despite using colchicine, prednisone, high dose nsaids, and tylenol #3. Has not needed pain medication in 2 weeks. This was her first episode and could have been precipitated by hydrochlorothiazide and protein poweder. She has stopped both. I have started allopurinol because pain was so debilitating and took so long to resolve. We will keep her on allopurinol for 6-12 months and monitor. If she does not have any additional flares, consider stopping allopurinol.   # ADHD - doing well on wellbutrin 300 mg, following with psychiatry. Her symptoms were best controlled on wellbutrin 450 mg and ritalin but both agents kemi her BP.   # Iron Deficiency anemia due to menorrhagia with regular cycle, uterine fibroids - her heavy menses have returned despite changing to mirena IUD in 10/2024. But they are not too painful. She does not tolerate oral iron so  follow her CBC/ferritin q 4 months. Refer for IV iron if indicated.   # GERD and history of duodenal ulcer (2016), H pylori: well controlled on famotidine and dietary measures. .   # History of obese, now overweight: has lost additional 8 lbs since 12/2024. She is going to decrease mounjaro to 5 mg weekly because she does not want to keep losing weight. I agree with this plan.   - starting weight 266 lbs in 7/29023  - did not tolerate contrave 2/2 GI upset, topamax caused foggyness, trulicity caused injection site reaction, phentermine kemi BP, wellbutrin (not effective)  # HTN assoc with DM: well controlled on lisinopril. Home monitor is accurate.   # HLP assoc with DM: LDL remains high. She is reluctant about starting statin therapy and does not know whether she wants to get pregnant.   # Type 2 Diabetes: c/b hypoglycemia. Decrease mounjaro to 5 mg weekly. She wears CGM.  - DM eye exam done 7/2024 by Clarence Bernstein w/o diabetic retinopathy in either eye.   - Foot Exam: done 2025   - LDL: see above  - BP: see above  - micro alb:creat - normal in 2025   - Depression Screen: done 2025  - not on ASA  # Migraines: cont abortive tx with nsaids  # Allergic rhinitis: not as well controlled with zyrtec and flonase. Add saline rinses.   # Health Maintenance: CPX on March, 2025  Colon Cancer Screening: underwent colonoscopy for GERD many years ago. Not indicated again until age 45   Cervical Cancer Screening: neg cytology, HPV in 12/2024. Repeat in 5 years (2029)  Breast Cancer Screening: she has mammogram scheduled.   Bone Health: educated on dietary calcium/vit D, weight bearing exercises.   Vaccines: advised on all indicated vaccines. TDAP given today  Hepatitis C Screen: Ab neg    Josefa Gamble NP - psychiatry       The patient indicates understanding of these issues and agrees to the plan.  The patient is asked to return in 1 year for physical exam. She is following very closely with [facility] for her weight  management and DM. They are checking her labs q 3 months as well.   Radha Lockwood MD

## 2025-03-26 NOTE — PATIENT INSTRUCTIONS
I recommend the following vaccines -  TDAP (tetanus, diptheriae, pertussis) vaccine every 10 years.     Annual FLU every September, October.    Stay up to date with COVID vaccines.     You are due for diabetic eye exam in July, 2025.

## 2025-03-27 PROBLEM — M1A.2720 DRUG-INDUCED CHRONIC GOUT OF LEFT FOOT WITHOUT TOPHUS: Status: ACTIVE | Noted: 2025-01-27

## 2025-03-27 PROBLEM — M10.272 ACUTE DRUG-INDUCED GOUT OF LEFT FOOT: Status: RESOLVED | Noted: 2025-01-27 | Resolved: 2025-03-27

## 2025-04-17 DIAGNOSIS — M10.272 ACUTE DRUG-INDUCED GOUT OF LEFT FOOT: ICD-10-CM

## 2025-04-17 RX ORDER — ALLOPURINOL 100 MG/1
100 TABLET ORAL DAILY
Qty: 90 TABLET | Refills: 3 | Status: SHIPPED | OUTPATIENT
Start: 2025-04-17

## 2025-05-10 LAB
ABSOLUTE BASOPHILS: 28 CELLS/UL (ref 0–200)
ABSOLUTE EOSINOPHILS: 221 CELLS/UL (ref 15–500)
ABSOLUTE LYMPHOCYTES: 1697 CELLS/UL (ref 850–3900)
ABSOLUTE MONOCYTES: 359 CELLS/UL (ref 200–950)
ABSOLUTE NEUTROPHILS: 2295 CELLS/UL (ref 1500–7800)
BASOPHILS: 0.6 %
EOSINOPHILS: 4.8 %
FERRITIN: 34 NG/ML (ref 16–154)
HEMATOCRIT: 36.1 % (ref 35–45)
HEMOGLOBIN: 10.6 G/DL (ref 11.7–15.5)
LYMPHOCYTES: 36.9 %
MCH: 23 PG (ref 27–33)
MCHC: 29.4 G/DL (ref 32–36)
MCV: 78.5 FL (ref 80–100)
MONOCYTES: 7.8 %
NEUTROPHILS: 49.9 %
PLATELET COUNT: 326 THOUSAND/UL (ref 140–400)
RDW: 15.3 % (ref 11–15)
RED BLOOD CELL COUNT: 4.6 MILLION/UL (ref 3.8–5.1)
WHITE BLOOD CELL COUNT: 4.6 THOUSAND/UL (ref 3.8–10.8)

## 2025-05-11 ENCOUNTER — PATIENT MESSAGE (OUTPATIENT)
Dept: INTERNAL MEDICINE CLINIC | Facility: CLINIC | Age: 40
End: 2025-05-11

## 2025-05-11 DIAGNOSIS — M10.272 DRUG-INDUCED GOUT OF LEFT FOOT, UNSPECIFIED CHRONICITY: Primary | ICD-10-CM

## 2025-05-13 RX ORDER — FLUTICASONE PROPIONATE 50 MCG
2 SPRAY, SUSPENSION (ML) NASAL DAILY
Qty: 48 G | Refills: 3 | Status: SHIPPED | OUTPATIENT
Start: 2025-05-13

## 2025-05-13 NOTE — TELEPHONE ENCOUNTER
KS: Please see most recent MCM regarding patient taking oral iron pills. Ok for her to take? Please advise, TY

## 2025-05-13 NOTE — TELEPHONE ENCOUNTER
LOV: 3/26/25   RTC: 1 yr   Filled: 1/3/25 #48mL 0 refills  Labs: 2/7/25   Future Appointments   Date Time Provider Department Center   6/6/2025 10:20 AM [facility]   12/18/2025  9:00 AM Elvie Cox MD [facility]

## 2025-06-04 ENCOUNTER — HOSPITAL ENCOUNTER (OUTPATIENT)
Dept: MAMMOGRAPHY | Age: 40
Discharge: HOME | End: 2025-06-04
Attending: STUDENT IN AN ORGANIZED HEALTH CARE EDUCATION/TRAINING PROGRAM
Payer: COMMERCIAL

## 2025-06-04 DIAGNOSIS — Z12.31 ENCOUNTER FOR SCREENING MAMMOGRAM FOR MALIGNANT NEOPLASM OF BREAST: ICD-10-CM

## 2025-06-04 PROCEDURE — 77067 SCR MAMMO BI INCL CAD: CPT | Performed by: STUDENT IN AN ORGANIZED HEALTH CARE EDUCATION/TRAINING PROGRAM

## 2025-06-04 PROCEDURE — 77063 BREAST TOMOSYNTHESIS BI: CPT | Performed by: STUDENT IN AN ORGANIZED HEALTH CARE EDUCATION/TRAINING PROGRAM

## 2025-07-15 RX ORDER — FAMOTIDINE 20 MG/1
20 TABLET, FILM COATED ORAL 2 TIMES DAILY
Qty: 180 TABLET | Refills: 3 | Status: SHIPPED | OUTPATIENT
Start: 2025-07-15

## 2025-07-15 NOTE — TELEPHONE ENCOUNTER
Protocol: Passed  Last Office Visit: 3/26/25  Labs: Completed in May  Last Refill: 3/1/24 #180 3 Refills   Return to Clinic:   Future Appointments   Date Time Provider Department Center   12/18/2025  9:00 AM Elvie Cox MD [facility]

## 2025-07-22 ENCOUNTER — PATIENT MESSAGE (OUTPATIENT)
Dept: INTERNAL MEDICINE CLINIC | Facility: CLINIC | Age: 40
End: 2025-07-22

## 2025-07-22 RX ORDER — TIRZEPATIDE 10 MG/.5ML
10 INJECTION, SOLUTION SUBCUTANEOUS ONCE
Qty: 6 ML | Refills: 3 | Status: SHIPPED | OUTPATIENT
Start: 2025-07-22 | End: 2025-07-22

## 2025-07-22 NOTE — TELEPHONE ENCOUNTER
KS: Patient requesting the 10 mg of Mounjaro since her weight has increased. Medication pended. Please sign if appropriate. Ty

## 2025-07-26 LAB — URIC ACID: 4.9 MG/DL (ref 2.5–7)

## 2025-07-27 ENCOUNTER — RESULTS FOLLOW-UP (OUTPATIENT)
Dept: INTERNAL MEDICINE CLINIC | Facility: CLINIC | Age: 40
End: 2025-07-27

## 2025-07-30 LAB
ALBUMIN/GLOBULIN RATIO: 1.8
ALBUMIN: 4 G/DL
ALKALINE PHOSPHATASE: 60
ALT: 12
AST: 12
BILIRUBIN, TOTAL: 0.6 MG/DL
CALCIUM: 8.8
CARBON DIOXIDE: 24
CHLORIDE: 108
CREATININE: 0.95 MG/DL
EGFR: 78
GLOBULIN: 2.2
GLUCOSE: 80
HEMOGLOBIN A1C: 5.5 %
POTASSIUM: 4.5
PROTEIN, TOTAL: 6.2
SODIUM: 140
UREA NITROGEN (MG/DL) IN SER/PLAS: 14 MG/DL

## (undated) NOTE — MR AVS SNAPSHOT
[place]  [address] [facility] [address]  [address] [facility] [address] [phone]               Thank you for choosing us for your health care visit with Collin Wood MD.  We are glad to serve you and happy to provide you with this summ Assoc Dx:  Essential hypertension [I10], Morbid obesity due to excess calories ([address] [E66.01], Type 2 diabetes mellitus without complication, without long-term current use of insulin ([facility] [address] [E11.9], Mixed hyperlipidemia [E78.2]           ALT(SGPT) [E]    C Pcn [Penicillins] Anaphylaxis    Pylera     Nerve pain      Sulfa Antibiotics Anaphylaxis                Today's Vital Signs     BP Pulse Temp Height Weight BMI    138/76 mmHg 103 98.3 °F (36.8 °C) (Oral) 69\" 281 lb 41.48 kg/m2         Current Medication Summaries. If you've been to the Emergency Department or your doctor's office, you can view your past visit information in [employer] by going to Visits < Visit Summaries. [employer] questions? Call [phone] for help.   [employer] is NOT to be used for urge

## (undated) NOTE — LETTER
Elle Gutierrez, :1985    CONSENT FOR PROCEDURE/SEDATION    1. [facility]  the following: IUD insertion    2.  I authorize Dr. Noy Kam MD (and whomever is designated as the doctor’s assistant), to perform the above-me _________________________________________ Date:___________     Physician Signature: _______________________________ Date:___________

## (undated) NOTE — LETTER
Elle Gutierrez, :1985    CONSENT FOR PROCEDURE/SEDATION    1. I authorize the performance upon Elle Gutierrez  the following:  IUD removal and re-insertion     2. I authorize Dr. Elvie Cox MD (and whomever is designated as the doctor’s assistant), to perform the above-mentioned procedures.    3. If any unforeseen conditions arise during this procedure calling for additional  procedures, operations, or medications (including anesthesia and blood transfusion), I further request and authorize the doctor to do whatever he/she deems advisable in my interest.    4. I consent to the taking and reproduction of any photographs in the course of this procedure for professional purposes.    5. I consent to the administration of such sedation as may be considered necessary or advisable by the physician responsible for this service, with the exception of ______________________________________________________    6. I have been informed by my doctor of the nature and purpose of this procedure sedation, possible alternative methods of treatment, risk involved and possible complications.    7. If I have a Do Not Resuscitate (DNR) order in place, the physician and I (or the individual authorized to consent on my behalf) will discuss and agree as to whether the Do Not Resuscitate (DNR) order will remain in effect or will be discontinued during the performance of the procedure and the applicable recovery period. If the Do Not Resuscitate (DNR) order is discontinued and is to be reinstated following the procedure/recovery period, the physician will determine when the applicable recovery period ends for purposes of reinstating the Do Not Resuscitate (DNR) order.    Signature of Patient:_______________________________________________    Signature of person authorized to consent for patient:  _______________________________________________________________    Relationship to patient:  ____________________________________________    Witness: _________________________________________ Date:___________     Physician Signature: _______________________________ Date:___________

## (undated) NOTE — MR AVS SNAPSHOT
[place]  [address] [facility] [address]  [address] [facility] [address] [phone]               Thank you for choosing us for your health care visit with Mia Perez MD.  We are glad to serve you and happy to provide you with this summ 2 sprays by Nasal route daily. Commonly known as:  FLONASE           hydrochlorothiazide 25 MG Tabs   Take 1 tablet (25 mg total) by mouth daily.    Commonly known as:  HYDRODIURIL           lisinopril 10 MG Tabs   Take 1 tablet (10 mg total) by mouth dwayne including white bread, rice and pasta   Eat plenty of protein, keep the fat content low Sugars:  sodas and sports drinks, candies and desserts   Eat plenty of low-fat dairy products High fat meats and dairy   Choose whole grain products Foods high in sodiu

## (undated) NOTE — LETTER
03/30/22        Elle Gutierrez  [address]      Dear Oral Means records indicate that you have outstanding lab work and or testing that was ordered for you and has not yet been completed:  Orders Placed This Encounter      Lipid Panel      Basic Metabolic Panel (8)      AST (SGOT)      ALT(SGPT)      Hemoglobin A1C      Microalb/Creat Ratio, Random Urine      Lipid Panel      Microalb/Creat Ratio, Random Urine      Hemoglobin A1C      Basic Metabolic Panel (8)      AST (SGOT)      ALT(SGPT)      CBC With Differential With Platelet      Ferritin [E]      OPHTHALMOLOGY - INTERNAL    To provide you with the best possible care, please complete these orders at your earliest convenience. If you have recently completed these orders please disregard this letter. If you have any questions please call the office at Dept: [phone].      Thank you,       Asif Harden MD

## (undated) NOTE — LETTER
08/31/18        [place] [facility] [address]      Dear Mac Hernandez records indicate that you have outstanding lab work and or testing that was ordered for you and has not yet been completed:        Hemoglobin A1C  To provide you with

## (undated) NOTE — LETTER
[facility] [place]  After Care Instructions for IUD      Bleeding   You may experience irregular bleeding for the fist 3-6 months.  If your bleeding becomes heavier than a normal menstrual cycle, please contact our office.    Pain  You may experience mild menstrual cramping after the IUD insertion that will typically last 24-48hrs.  You may take Ibuprofen, Tylenol or Aleve to relieve the discomfort.  If you experience severe or persistent pain, please contact our office.       Restrictions    You should avoid sexual intercourse or tampon use for 1 day after insertion.  Please use a backup method of contraception for 4-7 days with the Mirena and Patricia.    When to contact our office  If you are experiencing discomfort described as worse than menstrual cramps that is not relieved by ibuprofen   Fever of 100.4 or greater  Vaginal bleeding that is saturating 1 pad per hour    Any discomfort or poking sensation during intercourse or other sexual activity      Follow up in 4-6 weeks for IUD check.   If you have additional questions or concerns, please call us at [phone].

## (undated) NOTE — LETTER
08/05/21        Becca Sanchez  [address]  [address]      Dear Holland Becerra records indicate that you have outstanding lab work and or testing that was ordered for you and has not yet been completed:  Lab work   To schedule your test or lab

## (undated) NOTE — LETTER
06/13/25        Elle Gutierrez  [address] [address]      Dear Elle,    Our records indicate that you have outstanding lab work and or testing that was ordered for you and has not yet been completed:  Orders Placed This Encounter      Uric Acid [905][Q]    To provide you with the best possible care, please complete these orders at your earliest convenience. If you have recently completed these orders please disregard this letter.     If you have any questions please call the office at Dept: [phone].     Thank you,       Radha Lockwood MD